# Patient Record
Sex: MALE | URBAN - METROPOLITAN AREA
[De-identification: names, ages, dates, MRNs, and addresses within clinical notes are randomized per-mention and may not be internally consistent; named-entity substitution may affect disease eponyms.]

---

## 2022-01-03 NOTE — PROGRESS NOTES
[facility]  [address], [address]  Phone: [phone]  Fax: [phone]        INITIAL CONSULTATION      HISTORY OF PRESENT ILLNESS:  Lyric Alejandro is a 49 y.o. male whom is self referred secondary to left shoulder pain and LUE pain in a C8 distribution to 4th and 5th digit x 4 months after coughing. Pt denies neck pain. He rates his pain 0-10/10. Pt states his pain is alleviated with cervical flexion and reclining back into a recliner. Pt states his pain had been holding steady and has progressed since Christmas without specific injury. 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 Pt states he has treated with Flexeril and Prednisone through [facility]. Pt reports temporary relief with the Prednisone, no relief with Flexeril. [facility] records unavailable. Pt is uninsured which may limit treatment options. Patient denies previous cervical surgery, injections, or physical therapy/chiropractic care. Pt denies fever, weight loss, or skin changes. Pt denies dropping things or loss of balance. Pt denies dropping things or loss of balance. Patient denies history of glaucoma. Denies DM. Pt is a smoker. No PmHx. The patient is LHD.  reviewed. Body mass index is 36.03 kg/m². PCP: Unknown, Provider, DPM    History reviewed. No pertinent past medical history. JAMES  Past Surgical History:   Procedure Laterality Date    HAND/FINGER SURGERY UNLISTED     D/FINGER SURGERY UNLISTED          Tobacco Use    Smoking status: Current Every Day Smoker     Years: 1.00    Smokeless tobacco: Never Used   Substance Use Topics    Alcohol use: Never       Work status: The patient is employed. Marital status: N/A .          Allergies   Allergen Reactions    Penicillins Other (comments)     Anaphylaxis              Family History   Problem Relation Age of Onset    No Known Problems Mother     No Known Problems Father REVIEW OF SYSTEMS  Constitutional symptoms: Negative  Eyes: Negative  Ears, Nose, Throat, and Mouth: Negative  Cardiovascular: Negative  Respiratory: Negative  Genitourinary: Negative  Integumentary (Skin and/or breast): Negative  Musculoskeletal: Positive for LUE  Extremities: Negative for edema. Endocrine/Rheumatologic: Negative  Hematologic/Lymphatic: Negative  Allergic/Immunologic: Negative  Psychiatric: Negative       PHYSICAL EXAMINATION  Visit Vitals  Pulse 69   Temp 97 °F (36.1 °C)   Resp 19   Ht 5' 9\" (1.753 m)   Wt 244 lb (110.7 kg)   SpO2 98%   BMI 36.03 kg/m²       CONSTITUTIONAL: NAD, A&O x 3  HEART: Regular rate and rhythm  GASTROINTESTINAL: Positive bowel sounds, soft, nontender, and nondistended  LUNGS: Clear to auscultation bilaterally. SKIN: Negative for rash. RANGE OF MOTION: The patient has full passive range of motion in all four extremities. SENSATION: Decreased sensation to light touch on the ulnar aspect between the elbow and the wrist of the LUE. Otherwise, sensation is intact to light touch throughout. MOTOR:   Straight Leg Raise: Negative, bilateral  Noble: Negative, bilateral  Tandem Gait: Neg. Deep tendon reflexes are 2 at the biceps, 2 at the brachioradialis and 2 at the triceps, bilaterally. Deep tendon reflexes are 2 at the knees and 1 at the ankles bilaterally. Shoulder AB/Flex Elbow Flex Wrist Ext Elbow Ext Wrist Flex Hand Intrin Tone   Right +4/5 +4/5 +4/5 +4/5 +4/5 +4/5 +4/5   Left +4/5 +4/5 +4/5 +4/5 +4/5 +4/5 +4/5              Hip Flex Knee Ext Knee Flex Ankle DF GTE Ankle PF Tone   Right +4/5 +4/5 +4/5 +4/5 +4/5 +4/5 +4/5   Left +4/5 +4/5 +4/5 +4/5 +4/5 +4/5 +4/5     RADIOGRAPHS  Cervical spine plain films dated 1/4/2022. 2 views: AP and lateral. Revealed: Slight retrolisthesis of C3 on C4. Mild to moderate disc spacing of C3-4 and C5-6. ASSESSMENT   Diagnoses and all orders for this visit:    1.  Neck pain  -     AMB POC XRAY, SPINE, CERVICAL; 2 OR 3    2. Cervical pain    3. Cervical neuritis    4. Cervical spondylosis without myelopathy    5. Spondylolisthesis, acquired       IMPRESSIONS/RECOMMENDATIONS:  Patient presents today with c/o left shoulder pain and LUE pain in a C8 distribution to 4th and 5th digit x 4 months after coughing. Multiple treatment options were discussed. I offered starting him on neuropathic medication, pt wished to proceed. I will try him on Neurontin 300 mg TID. The risks, benefits, and potential side effects of this medication were discussed. Patient understands and wishes to proceed. Patient advised to call the office if intolerant to new medication. Physical therapy may be cost prohibitive as he is self insured, I advised him to contact my office if he wishes to proceed at a later time. Patient is neurologically intact. I will see the patient back in 1 month's time or earlier if needed. Written by Santana Curtis, as dictated by Bucky Berumen MD  I examined the patient, reviewed and agree with the note.

## 2022-01-04 ENCOUNTER — OFFICE VISIT (OUTPATIENT)
Dept: ORTHOPEDIC SURGERY | Age: 52
End: 2022-01-04

## 2022-01-04 VITALS
OXYGEN SATURATION: 98 % | HEIGHT: 69 IN | BODY MASS INDEX: 36.14 KG/M2 | WEIGHT: 244 LBS | TEMPERATURE: 97 F | RESPIRATION RATE: 19 BRPM | HEART RATE: 69 BPM

## 2022-01-04 DIAGNOSIS — M54.2 CERVICAL PAIN: ICD-10-CM

## 2022-01-04 DIAGNOSIS — M54.12 CERVICAL NEURITIS: ICD-10-CM

## 2022-01-04 DIAGNOSIS — M47.812 CERVICAL SPONDYLOSIS WITHOUT MYELOPATHY: ICD-10-CM

## 2022-01-04 DIAGNOSIS — M54.2 NECK PAIN: Primary | ICD-10-CM

## 2022-01-04 DIAGNOSIS — M43.10 SPONDYLOLISTHESIS, ACQUIRED: ICD-10-CM

## 2022-01-04 PROCEDURE — 99204 OFFICE O/P NEW MOD 45 MIN: CPT | Performed by: PHYSICAL MEDICINE & REHABILITATION

## 2022-01-04 PROCEDURE — 72040 X-RAY EXAM NECK SPINE 2-3 VW: CPT | Performed by: PHYSICAL MEDICINE & REHABILITATION

## 2022-01-04 RX ORDER — GABAPENTIN 300 MG/1
300 CAPSULE ORAL 3 TIMES DAILY
Qty: 90 CAPSULE | Refills: 1 | Status: SHIPPED | OUTPATIENT
Start: 2022-01-04 | End: 2022-03-04

## 2022-02-02 ENCOUNTER — TELEPHONE (OUTPATIENT)
Dept: ORTHOPEDIC SURGERY | Age: 52
End: 2022-02-02

## 2022-02-02 NOTE — TELEPHONE ENCOUNTER
Patient was last see and prescribed Gabapentin on 01/04/22. He has a follow up scheduled for Friday morning 02/04/22 but he will run out of medication after his morning dose tomorrow morning. He was advised not to miss any doses. He is asking if we can send a refill to his pharmacy.       [facility] Raoul Huertas  Patient [phone]

## 2022-02-02 NOTE — PROGRESS NOTES
[facility]  [address], [address]   Phone: [phone]  Fax: [phone]        PROGRESS NOTE      HISTORY OF PRESENT ILLNESS:  The patient is a 49 y.o. male and was seen today for follow up of left shoulder pain and LUE pain in a C8 distribution to 4th and 5th digit x 4 months after coughing. Pt denies neck pain. Pt states his pain is alleviated with cervical flexion and reclining back into a recliner. Pt states his pain had been holding steady and has progressed since Christmas without specific injury. 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 Pt states he has treated with Flexeril and Prednisone through [facility]. Pt reports temporary relief with the Prednisone, no relief with Flexeril. [facility] records unavailable. Patient denies previous cervical surgery, injections, or physical therapy/chiropractic care. Pt denies fever, weight loss, or skin changes. Pt denies dropping things or loss of balance. Pt denies dropping things or loss of balance. Patient denies history of glaucoma. Denies DM. Pt is a smoker. The patient is LHD. No PmHx. Cervical spine plain films dated 1/4/2022. 2 views: AP and lateral. Revealed: Slight retrolisthesis of C3 on C4. Mild to moderate disc spacing of C3-4 and C5-6. At his last clinical appointment, I offered starting him on neuropathic medication, pt wished to proceed. I tried him on Neurontin 300 mg TID. The risks, benefits, and potential side effects of this medication were discussed. Patient understands and wishes to proceed. Patient advised to call the office if intolerant to new medication. Physical therapy may be cost prohibitive as he was self insured, I advised him to contact my office if he wished to proceed at a later time. The patient returns today and reports pain location and distribution remains unchanged. He rates his pain 6/10, previously 0-10/10.  Pt is tolerating the Neurontin 300 mg TID with benefit. His pain is less intense and more intermittent in nature. Pt denies dropping things or loss of balance.  reviewed. Body mass index is 36.03 kg/m². PCP: Unknown, Provider, DPM      History reviewed. No pertinent past medical history. Social History     Socioeconomic History    Marital status: UNKNOWN     Spouse name: Not on file    Number of children: Not on file    Years of education: Not on file    Highest education level: Not on file   Occupational History    Not on file   Tobacco Use    Smoking status: Current Every Day Smoker     Years: 1.00    Smokeless tobacco: Never Used   Vaping Use    Vaping Use: Never used   Substance and Sexual Activity    Alcohol use: Never    Drug use: Yes     Types: Marijuana    Sexual activity: Not on file   Other Topics Concern    Not on file   Social History Narrative    Not on file     Social Determinants of Health     Financial Resource Strain:     Difficulty of Paying Living Expenses: Not on file   Food Insecurity:     Worried About Running Out of Food in the Last Year: Not on file    Samuel of Food in the Last Year: Not on file   Transportation Needs:     Lack of Transportation (Medical): Not on file    Lack of Transportation (Non-Medical):  Not on file   Physical Activity:     Days of Exercise per Week: Not on file    Minutes of Exercise per Session: Not on file   Stress:     Feeling of Stress : Not on file   Social Connections:     Frequency of Communication with Friends and Family: Not on file    Frequency of Social Gatherings with Friends and Family: Not on file    Attends Worship Services: Not on file    Active Member of Clubs or Organizations: Not on file    Attends Club or Organization Meetings: Not on file    Marital Status: Not on file   Intimate Partner Violence:     Fear of Current or Ex-Partner: Not on file    Emotionally Abused: Not on file    Physically Abused: Not on file    Sexually Abused: Not on file   Housing Stability:     Unable to Pay for Housing in the Last Year: Not on file    Number of Places Lived in the Last Year: Not on file    Unstable Housing in the Last Year: Not on file       Current Outpatient Medications   Medication Sig Dispense Refill    gabapentin (Neurontin) 300 mg capsule Take 1 Capsule by mouth three (3) times daily. Max Daily Amount: 900 mg. 90 Capsule 1       Allergies   Allergen Reactions    Penicillins Other (comments)     Anaphylaxis            PHYSICAL EXAMINATION    Visit Vitals  Pulse 77   Temp 97.6 °F (36.4 °C) (Temporal)   Ht 5' 9\" (1.753 m)   Wt 244 lb (110.7 kg)   SpO2 98%   BMI 36.03 kg/m²       CONSTITUTIONAL: NAD, A&O x 3  SENSATION: Intact to light touch throughout  NEURO: Kathryn's is negative bilaterally. RANGE OF MOTION: The patient has full passive range of motion in all four extremities. MOTOR:     Shoulder AB/Flex Elbow Flex Wrist Ext Elbow Ext Wrist Flex Hand Intrin Tone   Right +4/5 +4/5 +4/5 +4/5 +4/5 +4/5 +4/5   Left +4/5 +4/5 +4/5 +4/5 +4/5 +4/5 +4/5               ASSESSMENT   Diagnoses and all orders for this visit:    1. Neck pain    2. Cervical pain    3. Cervical neuritis    4. Cervical spondylosis without myelopathy    5. Spondylolisthesis, acquired      IMPRESSION AND PLAN:  Patient returns to the office today with c/o left shoulder pain and LUE pain in a C8 distribution to 4th and 5th digit. Multiple treatment options were discussed. I will increase his Neurontin 300 mg TID to 600 mg TID. Patient advised to call office if intolerant to higher dose. PT continues to be cost prohibitive at this time. Patient is neurologically intact. I will see the patient back in 1 month's time or earlier if needed. Written by Mason Sue, as dictated by Betty Quigley MD  I examined the patient, reviewed and agree with the note.

## 2022-02-04 ENCOUNTER — OFFICE VISIT (OUTPATIENT)
Dept: ORTHOPEDIC SURGERY | Age: 52
End: 2022-02-04

## 2022-02-04 VITALS
WEIGHT: 244 LBS | HEIGHT: 69 IN | OXYGEN SATURATION: 98 % | TEMPERATURE: 97.6 F | BODY MASS INDEX: 36.14 KG/M2 | HEART RATE: 77 BPM

## 2022-02-04 DIAGNOSIS — M43.10 SPONDYLOLISTHESIS, ACQUIRED: ICD-10-CM

## 2022-02-04 DIAGNOSIS — M54.2 CERVICAL PAIN: ICD-10-CM

## 2022-02-04 DIAGNOSIS — M54.2 NECK PAIN: Primary | ICD-10-CM

## 2022-02-04 DIAGNOSIS — M54.12 CERVICAL NEURITIS: ICD-10-CM

## 2022-02-04 DIAGNOSIS — M47.812 CERVICAL SPONDYLOSIS WITHOUT MYELOPATHY: ICD-10-CM

## 2022-02-04 PROCEDURE — 99214 OFFICE O/P EST MOD 30 MIN: CPT | Performed by: PHYSICAL MEDICINE & REHABILITATION

## 2022-02-04 RX ORDER — GABAPENTIN 600 MG/1
600 TABLET ORAL 3 TIMES DAILY
Qty: 90 TABLET | Refills: 1 | Status: SHIPPED | OUTPATIENT
Start: 2022-02-04 | End: 2022-03-04

## 2022-03-02 NOTE — PROGRESS NOTES
[facility]  [address], [address]   Phone: [phone]  Fax: [phone]        PROGRESS NOTE      HISTORY OF PRESENT ILLNESS:  The patient is a 49 y.o. male and was seen today for follow up of left shoulder pain and LUE pain in a C8 distribution to 4th and 5th digit x 4 months after coughing. Pt denies neck pain. Pt states his pain is alleviated with cervical flexion and reclining back into a recliner. Pt states his pain had been holding steady and has progressed since Christmas without specific injury. 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 Pt states he has treated with Flexeril and Prednisone through [facility]. Pt reports temporary relief with the Prednisone, no relief with Flexeril. [facility] records unavailable. Patient denies previous cervical surgery, injections, or physical therapy/chiropractic care. Pt denies fever, weight loss, or skin changes. Pt denies dropping things or loss of balance. Pt denies dropping things or loss of balance. Patient denies history of glaucoma. Denies DM. Pt is a smoker. The patient is LHD. PmHx noncontributory. Cervical spine plain films dated 1/4/2022. 2 views: AP and lateral. Revealed: Slight retrolisthesis of C3 on C4. Mild to moderate disc spacing of C3-4 and C5-6. At his last clinical appointment, I increased his Neurontin 300 mg TID to 600 mg TID. Patient was advised to call office if intolerant to higher dose. PT continued to be cost prohibitive.        The patient returns today and reports pain location and distribution remains unchanged. He rates his pain 3/10, previously 6/10. He is tolerating the Neurontin 600 mg TID w/ benefit. Pain is less intense in nature. Denies being on a higher dose. Pt denies dropping things or loss of balance.  reviewed. Body mass index is 36.45 kg/m². PCP: Unknown, Provider, DPM      History reviewed.  No pertinent past medical history. Social History     Socioeconomic History    Marital status: UNKNOWN     Spouse name: Not on file    Number of children: Not on file    Years of education: Not on file    Highest education level: Not on file   Occupational History    Not on file   Tobacco Use    Smoking status: Current Every Day Smoker     Years: 1.00    Smokeless tobacco: Never Used   Vaping Use    Vaping Use: Never used   Substance and Sexual Activity    Alcohol use: Never    Drug use: Yes     Types: Marijuana    Sexual activity: Not on file   Other Topics Concern    Not on file   Social History Narrative    Not on file     Social Determinants of Health     Financial Resource Strain:     Difficulty of Paying Living Expenses: Not on file   Food Insecurity:     Worried About Running Out of Food in the Last Year: Not on file    Samuel of Food in the Last Year: Not on file   Transportation Needs:     Lack of Transportation (Medical): Not on file    Lack of Transportation (Non-Medical):  Not on file   Physical Activity:     Days of Exercise per Week: Not on file    Minutes of Exercise per Session: Not on file   Stress:     Feeling of Stress : Not on file   Social Connections:     Frequency of Communication with Friends and Family: Not on file    Frequency of Social Gatherings with Friends and Family: Not on file    Attends Jew Services: Not on file    Active Member of [address] [place] [address] [employer] or Organizations: Not on file    Attends Club or Organization Meetings: Not on file    Marital Status: Not on file   Intimate Partner Violence:     Fear of Current or Ex-Partner: Not on file    Emotionally Abused: Not on file    Physically Abused: Not on file    Sexually Abused: Not on file   Housing Stability:     Unable to Pay for Housing in the Last Year: Not on file    Number of [place] in the Last Year: Not on file    Unstable Housing in the Last Year: Not on file           Allergies   Allergen Reactions    Penicillins Other (comments)     Anaphylaxis            PHYSICAL EXAMINATION    Visit Vitals  Pulse 72   Temp 97.9 °F (36.6 °C) (Temporal)   Resp 18   Ht 5' 9\" (1.753 m)   Wt 246 lb 12.8 oz (111.9 kg)   SpO2 98%   BMI 36.45 kg/m²       CONSTITUTIONAL: NAD, A&O x 3  SENSATION: Intact to light touch throughout  NEURO: Kathryn's is negative bilaterally. RANGE OF MOTION: The patient has full passive range of motion in all four extremities. MOTOR:     Shoulder AB/Flex Elbow Flex Wrist Ext Elbow Ext Wrist Flex Hand Intrin Tone   Right +4/5 +4/5 +4/5 +4/5 +4/5 +4/5 +4/5   Left +4/5 +4/5 +4/5 +4/5 +4/5 +4/5 +4/5                 ASSESSMENT   Diagnoses and all orders for this visit:    1. Neck pain    2. Cervical pain    3. Cervical neuritis    4. Cervical spondylosis without myelopathy    5. Spondylolisthesis, acquired      IMPRESSION AND PLAN:  Patient returns to the office today with c/o left shoulder pain and LUE pain in a C8 distribution to 4th and 5th digit. Multiple treatment options were discussed. I will increase his Neurontin 600 mg TID to 800 mg TID. Patient was advised to call office if intolerant to higher dose. Patient is neurologically intact. I will see the patient back in 1 month's time or earlier if needed. Written by Manuel Coates, as dictated by Ximena Lin MD  I examined the patient, reviewed and agree with the note.

## 2022-03-04 ENCOUNTER — OFFICE VISIT (OUTPATIENT)
Dept: ORTHOPEDIC SURGERY | Age: 52
End: 2022-03-04

## 2022-03-04 VITALS
HEART RATE: 72 BPM | OXYGEN SATURATION: 98 % | TEMPERATURE: 97.9 F | BODY MASS INDEX: 36.56 KG/M2 | WEIGHT: 246.8 LBS | HEIGHT: 69 IN | RESPIRATION RATE: 18 BRPM

## 2022-03-04 DIAGNOSIS — M54.2 NECK PAIN: Primary | ICD-10-CM

## 2022-03-04 DIAGNOSIS — M54.12 CERVICAL NEURITIS: ICD-10-CM

## 2022-03-04 DIAGNOSIS — M54.2 CERVICAL PAIN: ICD-10-CM

## 2022-03-04 DIAGNOSIS — M47.812 CERVICAL SPONDYLOSIS WITHOUT MYELOPATHY: ICD-10-CM

## 2022-03-04 DIAGNOSIS — M43.10 SPONDYLOLISTHESIS, ACQUIRED: ICD-10-CM

## 2022-03-04 PROCEDURE — 99214 OFFICE O/P EST MOD 30 MIN: CPT | Performed by: PHYSICAL MEDICINE & REHABILITATION

## 2022-03-04 RX ORDER — GABAPENTIN 800 MG/1
800 TABLET ORAL 3 TIMES DAILY
Qty: 90 TABLET | Refills: 1 | Status: SHIPPED | OUTPATIENT
Start: 2022-03-04 | End: 2022-04-26

## 2022-04-01 ENCOUNTER — TELEPHONE (OUTPATIENT)
Dept: ORTHOPEDIC SURGERY | Age: 52
End: 2022-04-01

## 2022-04-01 NOTE — TELEPHONE ENCOUNTER
Pt had an appointment with Dayanna Jansen on 4/8. Dayanna Jansen will not be in office that day. Pt rs to 4/26 and states he will be out of medication by then. Please call [phone].

## 2022-04-25 NOTE — PROGRESS NOTES
[facility]  [address], [place] [address]   Phone: [phone]  Fax: [phone]        PROGRESS NOTE      HISTORY OF PRESENT ILLNESS:  The patient is a 49 y.o. male and was seen today for follow up of left shoulder pain and LUE pain in a C8 distribution to 4th and 5th digit x 4 months after coughing. Pt denies neck pain. Pt states his pain is alleviated with cervical flexion and reclining back into a recliner. Pt states his pain had been holding steady and has progressed since Christmas without specific injury. 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 Pt states he has treated with Flexeril and Prednisone through [facility]. Pt reports temporary relief with the Prednisone, no relief with Flexeril. [facility] records unavailable. Patient denies previous cervical surgery, injections, or physical therapy/chiropractic care. Pt denies fever, weight loss, or skin changes. Pt denies dropping things or loss of balance. Pt denies dropping things or loss of balance. Patient denies history of glaucoma. Denies DM. Pt is a smoker. The patient is LHD. PmHx noncontributory. Cervical spine plain films dated 1/4/2022. 2 views: AP and lateral. Revealed: Slight retrolisthesis of C3 on C4. Mild to moderate disc spacing of C3-4 and C5-6. At his last clinical appointment, I increased his Neurontin 600 mg TID to 800 mg TID. Patient was advised to call office if intolerant to higher dose.       The patient returns today and reports pain location and distribution remains unchanged. He rates his pain 1/10, previously 3/10. He is tolerating the Neurontin 800 mg TID w/ benefit. He admits his pain is less intense in nature. Pt denies dropping things or loss of balance.  reviewed. Body mass index is 36.92 kg/m². PCP: Unknown, Provider, MD      History reviewed. No pertinent past medical history.      Social History     Socioeconomic History    Marital status: UNKNOWN     Spouse name: Not on file    Number of children: Not on file    Years of education: Not on file    Highest education level: Not on file   Occupational History    Not on file   Tobacco Use    Smoking status: Current Every Day Smoker     Years: 1.00    Smokeless tobacco: Never Used   Vaping Use    Vaping Use: Never used   Substance and Sexual Activity    Alcohol use: Never    Drug use: Yes     Types: Marijuana    Sexual activity: Not on file   Other Topics Concern    Not on file   Social History Narrative    Not on file     Social Determinants of Health     Financial Resource Strain:     Difficulty of Paying Living Expenses: Not on file   Food Insecurity:     Worried About [address] in the Last Year: Not on file    Samuel of Food in the Last Year: Not on file   Transportation Needs:     Lack of Transportation (Medical): Not on file    Lack of Transportation (Non-Medical):  Not on file   Physical Activity:     Days of Exercise per Week: Not on file    Minutes of Exercise per Session: Not on file   Stress:     Feeling of Stress : Not on file   Social Connections:     Frequency of Communication with Friends and Family: Not on file    Frequency of Social Gatherings with Friends and Family: Not on file    Attends Advent Services: Not on file    Active Member of [address] [place] [address] [employer] or Organizations: Not on file    Attends Club or Organization Meetings: Not on file    Marital Status: Not on file   Intimate Partner Violence:     Fear of Current or Ex-Partner: Not on file    Emotionally Abused: Not on file    Physically Abused: Not on file    Sexually Abused: Not on file   Housing Stability:     Unable to Pay for Housing in the Last Year: Not on file    Number of [place] in the Last Year: Not on file    Unstable Housing in the Last Year: Not on file       Current Outpatient Medications   Medication Sig Dispense Refill    gabapentin (Neurontin) 800 mg tablet Take 1 Tablet by mouth three (3) times daily. Max Daily Amount: 2,400 mg. 90 Tablet 1       Allergies   Allergen Reactions    Penicillins Other (comments)     Anaphylaxis            PHYSICAL EXAMINATION    Visit Vitals  Pulse 67   Temp 97.5 °F (36.4 °C)   Resp 17   Wt 250 lb (113.4 kg)   SpO2 94%   BMI 36.92 kg/m²       CONSTITUTIONAL: NAD, A&O x 3  SENSATION: Intact to light touch throughout  NEURO: Kathryn's is negative bilaterally. RANGE OF MOTION: The patient has full passive range of motion in all four extremities. MOTOR:     Shoulder AB/Flex Elbow Flex Wrist Ext Elbow Ext Wrist Flex Hand Intrin Tone   Right +4/5 +4/5 +4/5 +4/5 +4/5 +4/5 +4/5   Left +4/5 +4/5 +4/5 +4/5 +4/5 +4/5 +4/5                 ASSESSMENT   Diagnoses and all orders for this visit:    1. Neck pain    2. Cervical pain    3. Cervical neuritis    4. Cervical spondylosis without myelopathy    5. Spondylolisthesis, acquired      IMPRESSION AND PLAN:  Patient returns to the office today with c/o left shoulder pain and LUE pain in a C8 distribution to 4th and 5th digit. Multiple treatment options were discussed. I will try him on Cymbalta 30 mg daily. The risks, benefits, and potential side effects of this medication were discussed. Patient understands and wishes to proceed. Patient advised to call the office if intolerant to new medication. I provided him refills for Neurontin 800 mg TID. Patient is neurologically intact. I will see the patient back in 1 month's time or earlier if needed. Written by [employer] Groupl[employer]na Blackwell, as dictated by Nury Bonilla MD  I examined the patient, reviewed and agree with the note.

## 2022-04-26 ENCOUNTER — OFFICE VISIT (OUTPATIENT)
Dept: ORTHOPEDIC SURGERY | Age: 52
End: 2022-04-26

## 2022-04-26 VITALS
BODY MASS INDEX: 36.92 KG/M2 | WEIGHT: 250 LBS | TEMPERATURE: 97.5 F | RESPIRATION RATE: 17 BRPM | HEART RATE: 67 BPM | OXYGEN SATURATION: 94 %

## 2022-04-26 DIAGNOSIS — M54.2 CERVICAL PAIN: ICD-10-CM

## 2022-04-26 DIAGNOSIS — M54.2 NECK PAIN: Primary | ICD-10-CM

## 2022-04-26 DIAGNOSIS — M47.812 CERVICAL SPONDYLOSIS WITHOUT MYELOPATHY: ICD-10-CM

## 2022-04-26 DIAGNOSIS — M43.10 SPONDYLOLISTHESIS, ACQUIRED: ICD-10-CM

## 2022-04-26 DIAGNOSIS — M54.12 CERVICAL NEURITIS: ICD-10-CM

## 2022-04-26 PROCEDURE — 99214 OFFICE O/P EST MOD 30 MIN: CPT | Performed by: PHYSICAL MEDICINE & REHABILITATION

## 2022-04-26 RX ORDER — GABAPENTIN 800 MG/1
800 TABLET ORAL 3 TIMES DAILY
Qty: 270 TABLET | Refills: 0 | Status: SHIPPED | OUTPATIENT
Start: 2022-04-26 | End: 2022-05-27 | Stop reason: SDUPTHER

## 2022-04-26 RX ORDER — DULOXETIN HYDROCHLORIDE 30 MG/1
30 CAPSULE, DELAYED RELEASE ORAL DAILY
Qty: 30 CAPSULE | Refills: 1 | Status: SHIPPED | OUTPATIENT
Start: 2022-04-26 | End: 2022-05-27 | Stop reason: SDUPTHER

## 2022-05-27 ENCOUNTER — OFFICE VISIT (OUTPATIENT)
Dept: ORTHOPEDIC SURGERY | Age: 52
End: 2022-05-27

## 2022-05-27 VITALS
WEIGHT: 246 LBS | OXYGEN SATURATION: 94 % | HEART RATE: 60 BPM | TEMPERATURE: 98 F | RESPIRATION RATE: 18 BRPM | BODY MASS INDEX: 36.43 KG/M2 | HEIGHT: 69 IN

## 2022-05-27 DIAGNOSIS — M54.12 CERVICAL NEURITIS: Primary | ICD-10-CM

## 2022-05-27 DIAGNOSIS — M54.2 NECK PAIN: ICD-10-CM

## 2022-05-27 DIAGNOSIS — M43.10 SPONDYLOLISTHESIS, ACQUIRED: ICD-10-CM

## 2022-05-27 DIAGNOSIS — M47.812 CERVICAL SPONDYLOSIS WITHOUT MYELOPATHY: ICD-10-CM

## 2022-05-27 DIAGNOSIS — M54.2 CERVICAL PAIN: ICD-10-CM

## 2022-05-27 PROCEDURE — 99213 OFFICE O/P EST LOW 20 MIN: CPT | Performed by: PHYSICAL MEDICINE & REHABILITATION

## 2022-05-27 RX ORDER — GABAPENTIN 800 MG/1
800 TABLET ORAL 3 TIMES DAILY
Qty: 270 TABLET | Refills: 0 | Status: SHIPPED | OUTPATIENT
Start: 2022-05-27 | End: 2022-09-13 | Stop reason: SDUPTHER

## 2022-05-27 RX ORDER — DULOXETIN HYDROCHLORIDE 30 MG/1
30 CAPSULE, DELAYED RELEASE ORAL DAILY
Qty: 90 CAPSULE | Refills: 0 | Status: SHIPPED | OUTPATIENT
Start: 2022-05-27

## 2022-05-27 NOTE — PROGRESS NOTES
[facility]  [address], [place]   Phone: [phone]  Fax: [phone]        PROGRESS NOTE      HISTORY OF PRESENT ILLNESS:  The patient is a 49 y.o. male and was seen today for follow up of  left shoulder pain and LUE pain in a C8 distribution to 4th and 5th digit x 7 months after coughing. Pt denies neck pain. Pt states his pain is alleviated with cervical flexion and reclining back into a recliner. Pt states his pain had been holding steady and has progressed since Christmas without specific injury. 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 Pt states he has treated with Flexeril and Prednisone through [facility]. Pt reports temporary relief with the Prednisone, no relief with Flexeril. [facility] records unavailable. Patient denies previous cervical surgery, injections, or physical therapy/chiropractic care. Pt denies fever, weight loss, or skin changes. Pt denies dropping things or loss of balance. Pt denies dropping things or loss of balance. Patient denies history of glaucoma. Denies DM. Pt is a smoker. The patient is LHD. PmHx noncontributory. Cervical spine plain films dated 1/4/2022. 2 views: AP and lateral. Revealed: Slight retrolisthesis of C3 on C4. Mild to moderate disc spacing of C3-4 and C5-6.  I increased his Neurontin 600 mg TID to 800 mg TID. Patient was advised to call office if intolerant to higher dose. At his last clinical appointment, I tried him on Cymbalta 30 mg daily. The risks, benefits, and potential side effects of this medication were discussed. Patient understood and wished to proceed. Patient advised to call the office if intolerant to new medication. I provided him refills for Neurontin 800 mg TID. The patient returns today with left shoulder and LUE pain radiating into the C8 distribution. He rates his pain 0/10, previously 1/10. Pt reports his pain is completely gone. He continues to have numbness and tingling in his left forearm and left 4th and 5th digit. Denies dropping things or loss of balance. Pt is able to tolerate the combination Cymbalta 30 mg and Gabapentin 800 mg TID with benefit. He states he ran out of Gabapentin 800 mg, so he started taking Gabapentin 600 mg QID.  reviewed. Body mass index is 36.33 kg/m². PCP: Unknown, Provider, MD      History reviewed. No pertinent past medical history. Social History     Socioeconomic History    Marital status: UNKNOWN     Spouse name: Not on file    Number of children: Not on file    Years of education: Not on file    Highest education level: Not on file   Occupational History    Not on file   Tobacco Use    Smoking status: Current Every Day Smoker     Years: 1.00    Smokeless tobacco: Never Used   Vaping Use    Vaping Use: Never used   Substance and Sexual Activity    Alcohol use: Never    Drug use: Yes     Types: Marijuana    Sexual activity: Not on file   Other Topics Concern    Not on file   Social History Narrative    Not on file     Social Determinants of Health     Financial Resource Strain:     Difficulty of Paying Living Expenses: Not on file   Food Insecurity:     Worried About Running Out of Food in the Last Year: Not on file    Samuel of Food in the Last Year: Not on file   Transportation Needs:     Lack of Transportation (Medical): Not on file    Lack of Transportation (Non-Medical):  Not on file   Physical Activity:     Days of Exercise per Week: Not on file    Minutes of Exercise per Session: Not on file   Stress:     Feeling of Stress : Not on file   Social Connections:     Frequency of Communication with Friends and Family: Not on file    Frequency of Social Gatherings with Friends and Family: Not on file    Attends Mandaeism Services: Not on file    Active Member of Clubs or Organizations: Not on file    Attends Club or Organization Meetings: Not on file    Marital Status: Not on file   Intimate Partner Violence:     Fear of Current or Ex-Partner: Not on file    Emotionally Abused: Not on file    Physically Abused: Not on file    Sexually Abused: Not on file   Housing Stability:     Unable to Pay for Housing in the Last Year: Not on file    Number of [place] in the Last Year: Not on file    Unstable Housing in the Last Year: Not on file       Current Outpatient Medications   Medication Sig Dispense Refill    gabapentin (Neurontin) 800 mg tablet Take 1 Tablet by mouth three (3) times daily. Max Daily Amount: 2,400 mg. 270 Tablet 0    DULoxetine (CYMBALTA) 30 mg capsule Take 1 Capsule by mouth daily. 30 Capsule 1       Allergies   Allergen Reactions    Penicillins Other (comments)     Anaphylaxis            PHYSICAL EXAMINATION    Visit Vitals  Pulse 60   Temp 98 °F (36.7 °C) (Temporal)   Resp 18   Ht 5' 9\" (1.753 m)   Wt 246 lb (111.6 kg)   SpO2 94%   BMI 36.33 kg/m²       CONSTITUTIONAL: NAD, A&O x 3  SENSATION: Intact to light touch throughout  NEURO: Kathryn's is negative bilaterally. RANGE OF MOTION: The patient has full passive range of motion in all four extremities. MOTOR:  Straight Leg Raise: Not Tested, bilateral       Shoulder AB/Flex Elbow Flex Wrist Ext Elbow Ext Wrist Flex Hand Intrin Tone   Right +4/5 +4/5 +4/5 +4/5 +4/5 +4/5 +4/5   Left +4/5 +4/5 +4/5 +4/5 +4/5 +4/5 +4/5              Hip Flex Knee Ext Knee Flex Ankle DF GTE Ankle PF Tone   Right +4/5 +4/5 +4/5 +4/5 +4/5 +4/5 +4/5   Left +4/5 +4/5 +4/5 +4/5 +4/5 +4/5 +4/5       ASSESSMENT   Diagnoses and all orders for this visit:    1. Cervical neuritis    2. Cervical spondylosis without myelopathy    3. Spondylolisthesis, acquired    4. Cervical pain          IMPRESSION AND PLAN:  Patient returns to the office today with c/o left shoulder and LUE pain radiating into the C8 distribution. Patient is currently has no pain but continues to has numbness and tingling in is 4th and 5th digit.  Multiple treatment options were discussed. I discussed weaning the patient's Gabapentin, he would like to continue current course of treatment. I refilled his Cymbalta. Patient is neurologically intact. I will see the patient back in 3 month's time or earlier if needed. Written by Krissy Carlisle, as dictated by Fredrick Mahan MD  I examined the patient, reviewed and agree with the note.

## 2022-09-12 DIAGNOSIS — M54.12 CERVICAL NEURITIS: ICD-10-CM

## 2022-09-12 DIAGNOSIS — M54.2 NECK PAIN: ICD-10-CM

## 2022-09-12 RX ORDER — GABAPENTIN 800 MG/1
TABLET ORAL
Qty: 270 TABLET | Refills: 0 | OUTPATIENT
Start: 2022-09-12

## 2022-09-13 ENCOUNTER — TELEPHONE (OUTPATIENT)
Dept: ORTHOPEDIC SURGERY | Age: 52
End: 2022-09-13

## 2022-09-13 RX ORDER — GABAPENTIN 800 MG/1
800 TABLET ORAL 3 TIMES DAILY
Qty: 270 TABLET | Refills: 0 | Status: SHIPPED | OUTPATIENT
Start: 2022-09-13

## 2022-09-13 NOTE — TELEPHONE ENCOUNTER
, Ciro Grace MD  You 14 hours ago (6:58 PM)     NP  Looks like he missed his appointment last month. The message says he is out of his meds. Looks like he should have run out about a month ago. Can't start back at 800mg TID of neurontin. Will need to ramp back up. Ok to rx neurontin 300mg po TID no refills, ramp back up.   Should last until appointment 10/10/2022

## 2022-09-13 NOTE — TELEPHONE ENCOUNTER
Patient called to check the status of his refill request for Gabapentin. Patient was told that his request was refused, and he is asking to speak with a nurse to find out why. Please advise. Patient states he can be reached at [phone].

## 2022-09-13 NOTE — TELEPHONE ENCOUNTER
Patient states he ran out yesterday. All of the past prescriptions had refills and/or 270 tabs at the time so he had enough to get him to now.

## 2022-09-13 NOTE — TELEPHONE ENCOUNTER
Patient contacted and told that his message has not been answered by Dr Estrella Singleton yet and that I would call as soon as he does.

## 2022-11-21 ENCOUNTER — OFFICE VISIT (OUTPATIENT)
Dept: ORTHOPEDIC SURGERY | Age: 52
End: 2022-11-21

## 2022-11-21 VITALS
TEMPERATURE: 97.9 F | OXYGEN SATURATION: 95 % | WEIGHT: 257 LBS | HEIGHT: 69 IN | RESPIRATION RATE: 18 BRPM | HEART RATE: 74 BPM | BODY MASS INDEX: 38.06 KG/M2

## 2022-11-21 DIAGNOSIS — M54.12 CERVICAL NEURITIS: ICD-10-CM

## 2022-11-21 DIAGNOSIS — M47.812 CERVICAL SPONDYLOSIS WITHOUT MYELOPATHY: Primary | ICD-10-CM

## 2022-11-21 DIAGNOSIS — M54.2 NECK PAIN: ICD-10-CM

## 2022-11-21 DIAGNOSIS — M43.10 SPONDYLOLISTHESIS, ACQUIRED: ICD-10-CM

## 2022-11-21 PROCEDURE — 99213 OFFICE O/P EST LOW 20 MIN: CPT | Performed by: PHYSICAL MEDICINE & REHABILITATION

## 2022-11-21 RX ORDER — GABAPENTIN 800 MG/1
800 TABLET ORAL 3 TIMES DAILY
Qty: 270 TABLET | Refills: 1 | Status: SHIPPED | OUTPATIENT
Start: 2022-11-21

## 2022-11-21 NOTE — PROGRESS NOTES
[facility]  [address]  [facility], [facility] [address]  Phone: [phone]  Fax: [phone]        PROGRESS NOTE      HISTORY OF PRESENT ILLNESS:  The patient is a 47 y.o. male and was seen today for follow up of left shoulder pain and LUE pain in a C8 distribution to 4th and 5th digit x 7 months after coughing. Pt denies neck pain. Pt states his pain is alleviated with cervical flexion and reclining back into a recliner. Pt states his pain had been holding steady and has progressed since Christmas without specific injury. Pt states he has treated with Flexeril and Prednisone through [facility]. Pt reports temporary relief with the Prednisone, no relief with Flexeril. [facility] records unavailable. Patient denies previous cervical surgery, injections, or physical therapy/chiropractic care. Pt denies fever, weight loss, or skin changes. Pt denies dropping things or loss of balance. Pt denies dropping things or loss of balance. Patient denies history of glaucoma. Denies DM. Pt is a smoker. The patient is LHD. PmHx noncontributory. Cervical spine plain films dated 1/4/2022. 2 views: AP and lateral. Revealed: Slight retrolisthesis of C3 on C4. Mild to moderate disc spacing of C3-4 and C5-6. I increased his Neurontin 600 mg TID to 800 mg TID. Patient was advised to call office if intolerant to higher dose. At his last clinical appointment,  I discussed weaning the patient's Gabapentin, he would like to continue current course of treatment. I refilled his Cymbalta. The patient returns today with left shoulder pain that radiates to the LUE to the elbow, and paresthesia to digits 4 and 5. He rates his pain 0-3/10, previously 0/10. Patient reports he thinks he has new lateral left shoulder muscle pain. Pt denies dropping things or loss of balance. Patient says that overall his pain is the same when compared to last office visit.  Patient reports his pain is a 3/10 when he coughs. Patient is taking Gabapentin 800 mg TID. Patient reports he has been off of the Cymalta for the past 5 months.  reviewed. Body mass index is 37.95 kg/m². PCP: Unknown, Provider, MD      History reviewed. No pertinent past medical history. Social History     Socioeconomic History    Marital status: UNKNOWN     Spouse name: Not on file    Number of children: Not on file    Years of education: Not on file    Highest education level: Not on file   Occupational History    Not on file   Tobacco Use    Smoking status: Every Day     Years: 1.00     Types: Cigarettes    Smokeless tobacco: Never   Vaping Use    Vaping Use: Never used   Substance and Sexual Activity    Alcohol use: Never    Drug use: Yes     Types: Marijuana    Sexual activity: Not on file   Other Topics Concern    Not on file   Social History Narrative    Not on file     Social Determinants of Health     Financial Resource Strain: Not on file   Food Insecurity: Not on file   Transportation Needs: Not on file   Physical Activity: Not on file   Stress: Not on file   Social Connections: Not on file   Intimate Partner Violence: Not on file   Housing Stability: Not on file       Current Outpatient Medications   Medication Sig Dispense Refill    gabapentin (Neurontin) 800 mg tablet Take 1 Tablet by mouth three (3) times daily. Max Daily Amount: 2,400 mg. 270 Tablet 0    DULoxetine (CYMBALTA) 30 mg capsule Take 1 Capsule by mouth daily. (Patient not taking: Reported on 11/21/2022) 90 Capsule 0       Allergies   Allergen Reactions    Penicillins Other (comments)     Anaphylaxis            PHYSICAL EXAMINATION    Visit Vitals  Pulse 74   Temp 97.9 °F (36.6 °C) (Temporal)   Resp 18   Ht 5' 9\" (1.753 m)   Wt 257 lb (116.6 kg)   SpO2 95%   BMI 37.95 kg/m²       CONSTITUTIONAL: NAD, A&O x 3  SENSATION: Intact to light touch throughout  NEURO: Kathryn's is negative bilaterally.   RANGE OF MOTION: The patient has full passive range of motion in all four extremities. MOTOR:      Ambulates without assistive device. Shoulder AB/Flex Elbow Flex Wrist Ext Elbow Ext Wrist Flex Hand Intrin Tone   Right +4/5 +4/5 +4/5 +4/5 +4/5 +4/5 +4/5   Left +4/5 +4/5 +4/5 +4/5 +4/5 +4/5 +4/5                                               ASSESSMENT   Diagnoses and all orders for this visit:    1. Cervical spondylosis without myelopathy    2. Cervical neuritis    3. Neck pain    4. Spondylolisthesis, acquired        IMPRESSION AND PLAN:  Patient returns to the office today with c/o left shoulder pain that radiates to the LUE to the elbow, and paresthesia to digits 4 and 5. Multiple treatment options were discussed. Patient wished to continue his current treatment. Patient is not interested in restarting Cymbalta. I refilled his Gabapentin 800 mg TID. Patient is neurologically intact. I will see the patient back in 6 month's time or earlier if needed. Written by Radha Brady, as dictated by Reyna Posada MD  I examined the patient, reviewed and agree with the note.

## 2023-03-03 ENCOUNTER — OFFICE VISIT (OUTPATIENT)
Age: 53
End: 2023-03-03

## 2023-03-03 VITALS
RESPIRATION RATE: 20 BRPM | OXYGEN SATURATION: 97 % | HEART RATE: 93 BPM | WEIGHT: 257 LBS | TEMPERATURE: 98.1 F | BODY MASS INDEX: 37.95 KG/M2

## 2023-03-03 DIAGNOSIS — M43.10 SPONDYLOLISTHESIS, ACQUIRED: ICD-10-CM

## 2023-03-03 DIAGNOSIS — M47.812 CERVICAL SPONDYLOSIS WITHOUT MYELOPATHY: Primary | ICD-10-CM

## 2023-03-03 DIAGNOSIS — M54.12 CERVICAL NEURITIS: ICD-10-CM

## 2023-03-03 PROCEDURE — 99214 OFFICE O/P EST MOD 30 MIN: CPT | Performed by: PHYSICAL MEDICINE & REHABILITATION

## 2023-03-03 RX ORDER — DULOXETIN HYDROCHLORIDE 30 MG/1
30 CAPSULE, DELAYED RELEASE ORAL
Qty: 30 CAPSULE | Refills: 1 | Status: SHIPPED | OUTPATIENT
Start: 2023-03-03

## 2023-03-03 RX ORDER — METHYLPREDNISOLONE 4 MG/1
TABLET ORAL
Qty: 1 KIT | Refills: 0 | Status: SHIPPED | OUTPATIENT
Start: 2023-03-03

## 2023-03-03 NOTE — PROGRESS NOTES
[facility]  [address], [place]   Phone: [phone]  Fax: [phone]        PROGRESS NOTE      HISTORY OF PRESENT ILLNESS:  The patient is a 47 y.o. male and was seen today for follow up of left shoulder pain and LUE pain in a C8 distribution to 4th and 5th digit x 7 months after coughing. Pt denies neck pain. Pt states  his pain is alleviated with cervical flexion and reclining back into a recliner. Pt states his pain had been holding steady and has progressed since Christine without specific injury. Pt states he has treated with Flexeril and Prednisone through [facility]. Pt reports temporary relief with the Prednisone, no relief with Flexeril. [facility] records unavailable. Patient denies previous cervical surgery, injections, or physical therapy/chiropractic care. Pt denies fever, weight loss,  or skin changes. Pt denies dropping things or loss of balance. Pt denies dropping things or loss of balance. Patient denies history of glaucoma. Denies DM. Pt is a smoker. The patient is LHD. PmHx  noncontributory. Cervical spine plain films dated 1/4/2022. 2 views: AP and lateral.  Revealed: Slight retrolisthesis of C3 on C4. Mild to moderate disc spacing of C3-4 and C5-6. I increased his Neurontin 600 mg TID to 800 mg TID. Patient was advised to call office if intolerant  to higher dose. At his last clinical appointment, Patient wished to continue with the current treatment plan. Patient was not interested in restarting the Cymbalta. I refilled his Gabapentin 800 mg TID. Patient was last seen on 11/2/2022 and was told to follow up in 6 months, earlier than planned follow up. The patient returns today with pain location and distribution remains unchanged. Patient denies RUE symptoms. He rates his pain 0-9/10, previously 0-3/10.  Patient has had an increase in pain with unchanged pain distribution as the last office visit for the past 2 weeks w/o new injury. Patient says the pain has levelled off at this time. Patient says the left shoulder muscular pain has not settled down. Patient notes a previous left shoulder injury a few years ago. He denies loss of balance, falls, or impairments in manual dexterity. Patient denies DM, stomach ulcers, bleeding disorders, or blood thinners. Patient has been taking OTC Motrin 400 mg PRN with the Gabapentin 800 mg TID.  reviewed. Gabapentin me and medical cannabis Body mass index is 37.95 kg/m². PCP: No primary care provider on file. History reviewed. No pertinent past medical history. Social History     Socioeconomic History    Marital status: Unknown     Spouse name: None    Number of children: None    Years of education: None    Highest education level: None   Tobacco Use    Smoking status: Every Day    Smokeless tobacco: Never   Substance and Sexual Activity    Alcohol use: Never    Drug use: Yes     Types: Marijuana Ryan Sherly)       Current Outpatient Medications   Medication Sig Dispense Refill    gabapentin (NEURONTIN) 800 MG tablet Take 800 mg by mouth 3 times daily. No current facility-administered medications for this visit. Allergies   Allergen Reactions    Penicillins Other (See Comments)     Anaphylaxis          PHYSICAL EXAMINATION    Pulse 93   Temp 98.1 °F (36.7 °C)   Resp 20   Wt 257 lb (116.6 kg)   SpO2 97%   BMI 37.95 kg/m²     CONSTITUTIONAL: NAD, A&O x 3  SENSATION: Sensation is intact to light touch throughout. NEURO: Baljinder's is negative bilaterally. MOTOR:      Ambulates without an assistive device     Shoulder AB/Flex Elbow Flex Wrist Ext Elbow Ext Wrist Flex Hand Intrin Tone   Right +4/5 +4/5 +4/5 +4/5 +4/5 +4/5 +4/5   Left +4/5 +4/5 +4/5 +4/5 +4/5 +4/5 +4/5     ASSESSMENT   Betty Damico was seen today for shoulder pain.     Diagnoses and all orders for this visit:    Cervical spondylosis without myelopathy    Cervical neuritis    Spondylolisthesis, acquired        IMPRESSION AND PLAN:  Patient returns to the office today with c/o left shoulder pain and LUE pain in a C8 distribution to 4th and 5th digit. Multiple treatment options were discussed. I started her on a Medrol Dosepak, followed by OTC Motrin 800 mg TID for 2 weeks after the MDP. Patient does not need a refill of his Gabapentin 800 mg TID. Patient wished to restart the Cymbalta. I will refill his Cymbalta 30 mg QHS. I will give him an out of work note until 3/13/2023. Patient is neurologically intact. I will see the patient back in 1 month's time or earlier if needed. Written by Pablo Ventura, as dictated by Boris Guerrero MD  I examined the patient, reviewed and agree with the note.

## 2023-03-03 NOTE — LETTER
3/3/2023        [address] [place] [id number]      RETURN TO WORK OR SCHOOL    To Whom It May Concern:    Natividad Alford, date of birth 1970, is under the care of [facility] [address] He was seen in our office on 03/03/2023. Please have the patient contact our office if there are questions or concerns.         Sincerely,      Clarissa Burks MD

## 2023-03-03 NOTE — LETTER
3/3/2023        Fabrice Braden  [address]  [facility] [address]      RETURN TO WORK OR SCHOOL    To Whom It May Concern:    Fabrice Braden, date of birth 1970, is under the care of [facility].  He is released to return to work or school on 03/13/2023.    Please have the patient contact our office if there are questions or concerns.      Sincerely,      GABBI ESPINAL III, MD

## 2023-03-15 ENCOUNTER — TELEPHONE (OUTPATIENT)
Age: 53
End: 2023-03-15

## 2023-03-15 DIAGNOSIS — M54.12 CERVICAL NEURITIS: ICD-10-CM

## 2023-03-15 DIAGNOSIS — M54.12 RADICULOPATHY, CERVICAL REGION: ICD-10-CM

## 2023-03-15 DIAGNOSIS — M47.812 CERVICAL SPONDYLOSIS WITHOUT MYELOPATHY: Primary | ICD-10-CM

## 2023-03-15 RX ORDER — GABAPENTIN 800 MG/1
800 TABLET ORAL 3 TIMES DAILY
Qty: 90 TABLET | Refills: 0 | Status: SHIPPED | OUTPATIENT
Start: 2023-03-15 | End: 2023-04-14

## 2023-04-04 ENCOUNTER — TELEPHONE (OUTPATIENT)
Age: 53
End: 2023-04-04

## 2023-05-17 ENCOUNTER — OFFICE VISIT (OUTPATIENT)
Age: 53
End: 2023-05-17

## 2023-05-17 VITALS
RESPIRATION RATE: 17 BRPM | BODY MASS INDEX: 37.36 KG/M2 | TEMPERATURE: 97.1 F | WEIGHT: 253 LBS | HEART RATE: 73 BPM | OXYGEN SATURATION: 95 %

## 2023-05-17 DIAGNOSIS — M54.12 RADICULOPATHY, CERVICAL REGION: Primary | ICD-10-CM

## 2023-05-17 DIAGNOSIS — M43.10 SPONDYLOLISTHESIS, ACQUIRED: ICD-10-CM

## 2023-05-17 DIAGNOSIS — M47.812 CERVICAL SPONDYLOSIS WITHOUT MYELOPATHY: ICD-10-CM

## 2023-05-17 DIAGNOSIS — M54.12 CERVICAL NEURITIS: ICD-10-CM

## 2023-05-17 PROCEDURE — 99214 OFFICE O/P EST MOD 30 MIN: CPT | Performed by: PHYSICAL MEDICINE & REHABILITATION

## 2023-05-17 RX ORDER — PREGABALIN 75 MG/1
75 CAPSULE ORAL 2 TIMES DAILY
Qty: 60 CAPSULE | Refills: 1 | Status: SHIPPED | OUTPATIENT
Start: 2023-05-17 | End: 2023-07-16

## 2023-05-17 RX ORDER — DULOXETIN HYDROCHLORIDE 60 MG/1
60 CAPSULE, DELAYED RELEASE ORAL
Qty: 90 CAPSULE | Refills: 0 | Status: SHIPPED | OUTPATIENT
Start: 2023-05-17

## 2023-05-17 NOTE — PROGRESS NOTES
[facility]  [address], [place]   Phone: [phone]  Fax: [phone]        PROGRESS NOTE      HISTORY OF PRESENT ILLNESS:  The patient is a 47 y.o. male and was seen today for follow up of left shoulder pain and LUE pain in a C8 distribution to 4th and 5th digit x 7 months after coughing. Pt denies neck pain. Pt states  his pain is alleviated with cervical flexion and reclining back into a recliner. Pt states his pain had been holding steady and has progressed since Christine without specific injury. Pt states he has treated with Flexeril and Prednisone through [facility]. Pt reports temporary relief with the Prednisone, no relief with Flexeril. [facility] records unavailable. Patient denies previous cervical surgery, injections, or physical therapy/chiropractic care. Pt denies fever, weight loss,  or skin changes. Pt denies dropping things or loss of balance. Pt denies dropping things or loss of balance. Patient denies history of glaucoma. Denies DM. Pt is a smoker. The patient is LHD. PmHx  noncontributory. Cervical spine plain films dated 1/4/2022. 2 views: AP and lateral.  Revealed: Slight retrolisthesis of C3 on C4. Mild to moderate disc spacing of C3-4 and C5-6. I increased his Neurontin 600 mg TID to 800 mg TID. Patient was advised to call office if intolerant  to higher dose. At his last clinical appointment, I increased his Cymbalta 30 mg QHS to 60 mg QHS. Patient advised to call the office if intolerant to higher dose. I refilled his Gabapentin 800 mg TID. The patient returns today with pain location and distribution remains unchanged. He rates his pain 1-5/10, previously 3-8/10. Patient says that overall his pain is a little better when compared to the last office visit. Patient tolerates the Cymbalta 60 mg QHS with relief. Patient still takes the Gabapentin 800 mg TID.  He denies loss of balance, falls, or impairments in

## 2023-07-05 ENCOUNTER — OFFICE VISIT (OUTPATIENT)
Age: 53
End: 2023-07-05

## 2023-07-05 VITALS
TEMPERATURE: 97.5 F | OXYGEN SATURATION: 97 % | HEIGHT: 69 IN | WEIGHT: 250 LBS | HEART RATE: 71 BPM | RESPIRATION RATE: 18 BRPM | BODY MASS INDEX: 37.03 KG/M2

## 2023-07-05 DIAGNOSIS — M75.41 CORACOID IMPINGEMENT OF RIGHT SHOULDER: Primary | ICD-10-CM

## 2023-07-05 DIAGNOSIS — M54.12 RADICULOPATHY, CERVICAL REGION: ICD-10-CM

## 2023-07-05 DIAGNOSIS — M54.12 CERVICAL NEURITIS: ICD-10-CM

## 2023-07-05 DIAGNOSIS — M47.812 CERVICAL SPONDYLOSIS WITHOUT MYELOPATHY: ICD-10-CM

## 2023-07-05 DIAGNOSIS — M43.12 SPONDYLOLISTHESIS, CERVICAL REGION: ICD-10-CM

## 2023-07-05 PROCEDURE — 99214 OFFICE O/P EST MOD 30 MIN: CPT | Performed by: PHYSICAL MEDICINE & REHABILITATION

## 2023-07-05 RX ORDER — PREGABALIN 75 MG/1
75 CAPSULE ORAL 2 TIMES DAILY
Qty: 60 CAPSULE | Refills: 1 | Status: SHIPPED | OUTPATIENT
Start: 2023-07-05 | End: 2023-09-03

## 2023-07-05 NOTE — PROGRESS NOTES
[facility]  [address]  [address]  [place], [place]  Tel: [phone]  Fax: [phone]          PROGRESS NOTE      HISTORY OF PRESENT ILLNESS:  The patient is a 47 y.o. male and was seen today for follow up of left shoulder pain and LUE pain in a C8 distribution to 4th and 5th digit x 7 months after coughing. Pt denies neck pain. Pt states  his pain is alleviated with cervical flexion and reclining back into a recliner. Pt states his pain had been holding steady and has progressed since Christine without specific injury. Pt states he has treated with Flexeril and Prednisone through [facility]. Pt reports temporary relief with the Prednisone, no relief with Flexeril. [facility] records unavailable. Patient denies previous cervical surgery, injections, or physical therapy/chiropractic care. Pt denies fever, weight loss,  or skin changes. Pt denies dropping things or loss of balance. Pt denies dropping things or loss of balance. Patient denies history of glaucoma. Denies DM. Pt is a smoker. The patient is LHD. PmHx  noncontributory. Cervical spine plain films dated 1/4/2022. 2 views: AP and lateral.  Revealed: Slight retrolisthesis of C3 on C4. Mild to moderate disc spacing of C3-4 and C5-6. At his last clinical appointment, I discussed changing his neuropathic medication, pt wished to continue. I refilled his Cymbalta 60 mg QHS. I weaned him off the Gabapentin 800 mg TID. I tried him on Lyrica 75 mg BID. The patient returns today with pain location and distribution remains unchanged. He rates his pain 0-3/10, previously 1-5/10. Patient says that overall his pain is better when compared to the last office visit. Patient says he did not start the Lyrica 75 mg BID. Patient is no longer taking the Gabapentin 800 mg TID at this time. He denies loss of balance, falls, or impairments in manual dexterity.     Patient notes right shoulder pain to the

## 2023-08-01 ENCOUNTER — OFFICE VISIT (OUTPATIENT)
Age: 53
End: 2023-08-01

## 2023-08-01 VITALS
OXYGEN SATURATION: 94 % | WEIGHT: 250.6 LBS | TEMPERATURE: 98.4 F | BODY MASS INDEX: 37.12 KG/M2 | HEIGHT: 69 IN | HEART RATE: 90 BPM

## 2023-08-01 DIAGNOSIS — M25.511 ACUTE PAIN OF RIGHT SHOULDER: ICD-10-CM

## 2023-08-01 DIAGNOSIS — S46.011A TRAUMATIC TEAR OF RIGHT ROTATOR CUFF, UNSPECIFIED TEAR EXTENT, INITIAL ENCOUNTER: Primary | ICD-10-CM

## 2023-08-01 PROCEDURE — 99214 OFFICE O/P EST MOD 30 MIN: CPT | Performed by: ORTHOPAEDIC SURGERY

## 2023-08-01 PROCEDURE — 73030 X-RAY EXAM OF SHOULDER: CPT | Performed by: ORTHOPAEDIC SURGERY

## 2023-08-01 SDOH — HEALTH STABILITY: PHYSICAL HEALTH: ON AVERAGE, HOW MANY MINUTES DO YOU ENGAGE IN EXERCISE AT THIS LEVEL?: 150+ MIN

## 2023-08-01 SDOH — HEALTH STABILITY: PHYSICAL HEALTH: ON AVERAGE, HOW MANY DAYS PER WEEK DO YOU ENGAGE IN MODERATE TO STRENUOUS EXERCISE (LIKE A BRISK WALK)?: 5 DAYS

## 2023-08-01 ASSESSMENT — SOCIAL DETERMINANTS OF HEALTH (SDOH)
WITHIN THE LAST YEAR, HAVE YOU BEEN AFRAID OF YOUR PARTNER OR EX-PARTNER?: PATIENT DECLINED
WITHIN THE LAST YEAR, HAVE TO BEEN RAPED OR FORCED TO HAVE ANY KIND OF SEXUAL ACTIVITY BY YOUR PARTNER OR EX-PARTNER?: PATIENT DECLINED
WITHIN THE LAST YEAR, HAVE YOU BEEN HUMILIATED OR EMOTIONALLY ABUSED IN OTHER WAYS BY YOUR PARTNER OR EX-PARTNER?: PATIENT DECLINED
WITHIN THE LAST YEAR, HAVE YOU BEEN KICKED, HIT, SLAPPED, OR OTHERWISE PHYSICALLY HURT BY YOUR PARTNER OR EX-PARTNER?: PATIENT DECLINED

## 2023-08-01 NOTE — PROGRESS NOTES
Examination     R   L  ROM   FF  Full   Full  ER  Full   Full   IR  Full   Full  Rotator Cuff Pain/Weakness   Supra  +   -   Infra  +   -   Subscap -   -  Crepitus  -   -  Effusion  -   -  Warmth  -   -   Erythema  -   -  Instability  -   -  AC Joint TTP  -   -  Clavicle   Deformity -   -   TTP  -   -  Proximal Humerus   Deformity -   -   TTP  -   -  Deltoid Strength 5   5  Biceps Strength 5   5  Biceps Deformity -   -  Biceps Groove Pain -   -  Impingement Sign -   -       IMAGING:  Imaging read by myself and interpreted as follows:  X-rays 4 views of the right shoulder were taken in the office today. These show sclerosis of the greater tuberosity concerning for rotator cuff pathology. No other acute or chronic bony abnormalities are noted. ASSESSMENT & PLAN  Diagnosis: Right shoulder rotator cuff pain, possible rotator cuff tear    Phil Richardson has 6 weeks of right shoulder pain after lifting and carrying a heavy object. Since that time he has noticed increasing weakness and increasing pain in his right shoulder. He has difficulty with range of motion and pain and weakness with rotator cuff testing. Despite 6 weeks of conservative treatment including anti-inflammatory injection as well as oral anti-inflammatories and giving this time to settle down the cyst continue to be symptomatic. Therefore I recommended and ordered an MRI of the right shoulder. I will see him back after that is completed. Prescription medication management discussed. Plan was discussed in detail with patient, all questions were answered, and patient voiced understanding of plan. Electronically signed by: Evon Donvoan MD    Note: This note was completed using voice recognition software.   Any typographical/name errors or mistakes are unintentional.

## 2023-08-03 ENCOUNTER — HOSPITAL ENCOUNTER (OUTPATIENT)
Facility: HOSPITAL | Age: 53
Discharge: HOME | End: 2023-08-03
Attending: ORTHOPAEDIC SURGERY

## 2023-08-03 DIAGNOSIS — S46.011A TRAUMATIC TEAR OF RIGHT ROTATOR CUFF, UNSPECIFIED TEAR EXTENT, INITIAL ENCOUNTER: ICD-10-CM

## 2023-08-03 PROCEDURE — 73221 MRI JOINT UPR EXTREM W/O DYE: CPT

## 2023-08-15 ENCOUNTER — OFFICE VISIT (OUTPATIENT)
Age: 53
End: 2023-08-15

## 2023-08-15 VITALS — BODY MASS INDEX: 37.01 KG/M2 | HEIGHT: 69 IN | RESPIRATION RATE: 18 BRPM

## 2023-08-15 DIAGNOSIS — S43.431A SUPERIOR GLENOID LABRUM LESION OF RIGHT SHOULDER, INITIAL ENCOUNTER: ICD-10-CM

## 2023-08-15 DIAGNOSIS — S46.011A TRAUMATIC COMPLETE TEAR OF RIGHT ROTATOR CUFF, INITIAL ENCOUNTER: Primary | ICD-10-CM

## 2023-08-15 PROCEDURE — 99214 OFFICE O/P EST MOD 30 MIN: CPT | Performed by: ORTHOPAEDIC SURGERY

## 2023-08-15 RX ORDER — TRAMADOL HYDROCHLORIDE 50 MG/1
50 TABLET ORAL EVERY 4 HOURS PRN
Qty: 42 TABLET | Refills: 0 | Status: SHIPPED | OUTPATIENT
Start: 2023-08-15 | End: 2023-08-22

## 2023-08-21 ENCOUNTER — OFFICE VISIT (OUTPATIENT)
Age: 53
End: 2023-08-21

## 2023-08-21 VITALS
TEMPERATURE: 97.9 F | BODY MASS INDEX: 37.5 KG/M2 | HEIGHT: 69 IN | OXYGEN SATURATION: 94 % | HEART RATE: 82 BPM | WEIGHT: 253.2 LBS

## 2023-08-21 DIAGNOSIS — M54.12 RADICULOPATHY, CERVICAL REGION: Primary | ICD-10-CM

## 2023-08-21 DIAGNOSIS — M43.12 SPONDYLOLISTHESIS, CERVICAL REGION: ICD-10-CM

## 2023-08-21 DIAGNOSIS — M54.12 CERVICAL NEURITIS: ICD-10-CM

## 2023-08-21 DIAGNOSIS — S46.011A TRAUMATIC COMPLETE TEAR OF RIGHT ROTATOR CUFF, INITIAL ENCOUNTER: ICD-10-CM

## 2023-08-21 DIAGNOSIS — M47.812 CERVICAL SPONDYLOSIS WITHOUT MYELOPATHY: ICD-10-CM

## 2023-08-21 PROCEDURE — 99214 OFFICE O/P EST MOD 30 MIN: CPT | Performed by: PHYSICAL MEDICINE & REHABILITATION

## 2023-08-21 RX ORDER — DULOXETIN HYDROCHLORIDE 60 MG/1
60 CAPSULE, DELAYED RELEASE ORAL
Qty: 90 CAPSULE | Refills: 0 | Status: SHIPPED | OUTPATIENT
Start: 2023-08-21

## 2023-08-21 RX ORDER — PREGABALIN 150 MG/1
150 CAPSULE ORAL 2 TIMES DAILY
Qty: 60 CAPSULE | Refills: 1 | Status: SHIPPED | OUTPATIENT
Start: 2023-08-21 | End: 2023-10-20

## 2023-08-21 NOTE — PROGRESS NOTES
[facility]  [id number]  [address]  [place], [place]  Tel: [phone]  Fax: [phone]          PROGRESS NOTE      HISTORY OF PRESENT ILLNESS:  The patient is a 47 y.o. male and was seen today for follow up of left shoulder pain and LUE pain in a C8 distribution to 4th and 5th digit x 7 months after coughing. Pt denies neck pain. Pt states his pain is alleviated with cervical flexion and reclining back into a recliner. Pt states his pain had been holding steady and has progressed since [place] without specific injury. Pt states he has treated with Flexeril and Prednisone through [facility]. Pt reports temporary relief with the Prednisone, no relief with Flexeril. [facility] records unavailable. Patient denies previous cervical surgery, injections, or physical therapy/chiropractic care. Pt denies fever, weight loss,  or skin changes. Pt denies dropping things or loss of balance. Pt denies dropping things or loss of balance. Patient denies history of glaucoma. Denies DM. Pt is a smoker. The patient is LHD. PmHx  noncontributory. Cervical spine plain films dated 1/4/2022. 2 views: AP and lateral.  Revealed: Slight retrolisthesis of C3 on C4. Mild to moderate disc spacing of C3-4 and C5-6. At his last clinical appointment, I referred him to Dr. Kat Giordano for his right shoulder impingement. I discussed adding the Lyrica to his Cymbalta or keeping it the same. Patient wished to start the Lyrica 75 mg BID. Patient did not need a refill of his Cymbalta 60 mg QHS, but I told him to call the office if he needed a refill before the next office visit. I tried him on Lyrica 75 mg BID. The patient returns today with pain location and distribution remains unchanged. He rates his pain 0-3/10, previously 0-3/10. Patient says that overall his pain is the same when compared to the last office visit. Patient is taking Lyrica 75 mg BID and Cymbalta 60 mg QHS.

## 2023-09-18 ENCOUNTER — OFFICE VISIT (OUTPATIENT)
Age: 53
End: 2023-09-18

## 2023-09-18 VITALS
WEIGHT: 251 LBS | BODY MASS INDEX: 37.07 KG/M2 | HEART RATE: 90 BPM | OXYGEN SATURATION: 95 % | TEMPERATURE: 97.5 F | RESPIRATION RATE: 18 BRPM

## 2023-09-18 DIAGNOSIS — M54.12 CERVICAL NEURITIS: ICD-10-CM

## 2023-09-18 DIAGNOSIS — M47.812 CERVICAL SPONDYLOSIS WITHOUT MYELOPATHY: ICD-10-CM

## 2023-09-18 DIAGNOSIS — M43.12 SPONDYLOLISTHESIS, CERVICAL REGION: ICD-10-CM

## 2023-09-18 DIAGNOSIS — M54.12 RADICULOPATHY, CERVICAL REGION: Primary | ICD-10-CM

## 2023-09-18 PROCEDURE — 99213 OFFICE O/P EST LOW 20 MIN: CPT | Performed by: PHYSICAL MEDICINE & REHABILITATION

## 2023-09-18 RX ORDER — PREGABALIN 150 MG/1
150 CAPSULE ORAL 2 TIMES DAILY
Qty: 60 CAPSULE | Refills: 2 | Status: SHIPPED | OUTPATIENT
Start: 2023-09-18 | End: 2023-12-17

## 2023-09-18 NOTE — PROGRESS NOTES
[facility]  [address]  [address]  [place], [place]  Tel: [phone]  Fax: [phone]          PROGRESS NOTE      HISTORY OF PRESENT ILLNESS:  The patient is a 47 y.o. male and was seen today for follow up of  left shoulder pain and LUE pain in a C8 distribution to 4th and 5th digit x 7 months after coughing. Pt denies neck pain. Pt states his pain is alleviated with cervical flexion and reclining back into a recliner. Pt states his pain had been holding steady and has progressed since [place] without specific injury. Pt states he has treated with Flexeril and Prednisone through [facility]. Pt reports temporary relief with the Prednisone, no relief with Flexeril. [facility] records unavailable. Patient denies previous cervical surgery, injections, or physical therapy/chiropractic care. Pt denies fever, weight loss,  or skin changes. Pt denies dropping things or loss of balance. Pt denies dropping things or loss of balance. Patient denies history of glaucoma. Denies DM. Pt is a smoker. The patient is LHD. PmHx  noncontributory. Note from Dr. Juvenal Winkler dated 8/15/2023 indicating patient was seen with a c/o right shoulder pain. Patient had a full-thickness tear of his supraspinatus that extends into the upper border. Recommended a right arthroscopic rotator cuff repair. Patient wished to moved forward with surgery. Cervical spine plain films dated 1/4/2022. 2 views: AP and lateral.  Revealed: Slight retrolisthesis of C3 on C4. Mild to moderate disc spacing of C3-4 and C5-6. At his last clinical appointment, I refilled his Cymbalta 60 mg QHS. I increased his Lyrica 75 mg BID to 150 mg BID. Patient advised to call the office if intolerant to higher dose. I told him to call the office if he has shoulder surgery a couple of days before the next office visit, so we can do it online.        The patient returns today with pain location and distribution

## 2023-10-06 NOTE — PERIOP NOTE
Instructions for your surgery at [address] [place] [address] Date:  10/6/2023      Patient's Name:  Preeti Loyola           Surgery Date:  10/16/2023              Please enter the main entrance of the hospital and check-in at the  located in the [facility]. Once checked in at the , you will take the elevators to the second floor, and report to the waiting room on the left. The room will say Procedure Registration. Do NOT eat or drink anything, including candy, gum, or ice chips after midnight prior to your surgery, unless you have specific instructions from your surgeon or anesthesia provider to do so. Brush your teeth before coming to the hospital. You may swish with water, but do not swallow. No smoking/Vaping/E-Cigarettes 24 hours prior to the day of surgery. No alcohol 24 hours prior to the day of surgery. No recreational drugs for one week prior to the day of surgery. Bring Photo ID, Insurance information, and Co-pay if required on day of surgery. Bring in pertinent legal documents, such as, Medical Power of MARILY PRO, DNR, Advance Directive, etc.  Leave all valuables, including money/purse, at home. Remove all jewelry, including ALL body piercings, nail polish, acrylic nails, and makeup (including mascara); no lotions, powders, deodorant, or perfume/cologne/after shave on the skin. Follow instruction for Hibiclens washes and CHG wipes from surgeon's office. Glasses and dentures may be worn to the hospital. They must be removed prior to surgery. Please bring case/container for glasses or dentures. Contact lenses should not be worn on day of surgery. Call your doctor's office if symptoms of a cold or illness develop within 24-48 hours prior to your surgery. Call your doctor's office if you have any questions concerning insurance or co-pays. 15. AN ADULT (relative or friend 25 years or older) [address]   16. Please make

## 2023-10-11 ENCOUNTER — OFFICE VISIT (OUTPATIENT)
Age: 53
End: 2023-10-11

## 2023-10-11 VITALS
HEIGHT: 71 IN | WEIGHT: 250 LBS | BODY MASS INDEX: 35 KG/M2 | DIASTOLIC BLOOD PRESSURE: 94 MMHG | SYSTOLIC BLOOD PRESSURE: 134 MMHG | RESPIRATION RATE: 20 BRPM

## 2023-10-11 DIAGNOSIS — S46.011A TRAUMATIC COMPLETE TEAR OF RIGHT ROTATOR CUFF, INITIAL ENCOUNTER: Primary | ICD-10-CM

## 2023-10-11 DIAGNOSIS — S43.431A SUPERIOR GLENOID LABRUM LESION OF RIGHT SHOULDER, INITIAL ENCOUNTER: ICD-10-CM

## 2023-10-11 PROCEDURE — 99024 POSTOP FOLLOW-UP VISIT: CPT | Performed by: ORTHOPAEDIC SURGERY

## 2023-10-11 RX ORDER — OXYCODONE HYDROCHLORIDE AND ACETAMINOPHEN 5; 325 MG/1; MG/1
2 TABLET ORAL EVERY 4 HOURS PRN
Qty: 35 TABLET | Refills: 0 | Status: SHIPPED | OUTPATIENT
Start: 2023-10-11 | End: 2023-10-18

## 2023-10-11 NOTE — PROGRESS NOTES
Erythema                     -                                   -  Instability                     -                                   -  AC Joint TTP               -                                   -  Clavicle              Deformity         -                                   -              TTP                 -                                   -  Proximal Humerus              Deformity         -                                   -              TTP                 -                                   -  Deltoid Strength          5                                  5  Biceps Strength          5                                  5  Biceps Deformity         -                                   -  Biceps Groove Pain    +                                  -  Impingement Sign       -                                   -             Pain with SLAP tear testing right shoulder    IMAGING:  Imaging read by myself and interpreted as follows:      ASSESSMENT & PLAN  Diagnosis: Right shoulder rotator cuff tear and SLAP tear    Jos Mention is here today for his upcoming right shoulder surgery. The surgery was discussed. All of his questions were answered. Pain medication was prescribed. Follow-up postoperatively. Prescription medication management discussed. Plan was discussed in detail with patient, all questions were answered, and patient voiced understanding of plan. Electronically signed by: Evon Donovan MD    Note: This note was completed using voice recognition software.   Any typographical/name errors or mistakes are unintentional.

## 2023-10-13 ENCOUNTER — PREP FOR PROCEDURE (OUTPATIENT)
Age: 53
End: 2023-10-13

## 2023-10-13 ENCOUNTER — ANESTHESIA EVENT (OUTPATIENT)
Facility: HOSPITAL | Age: 53
End: 2023-10-13

## 2023-10-13 RX ORDER — SODIUM CHLORIDE 0.9 % (FLUSH) 0.9 %
5-40 SYRINGE (ML) INJECTION EVERY 12 HOURS SCHEDULED
Status: CANCELLED | OUTPATIENT
Start: 2023-10-13

## 2023-10-13 RX ORDER — SODIUM CHLORIDE 0.9 % (FLUSH) 0.9 %
5-40 SYRINGE (ML) INJECTION PRN
Status: CANCELLED | OUTPATIENT
Start: 2023-10-13

## 2023-10-13 RX ORDER — CELECOXIB 100 MG/1
200 CAPSULE ORAL
Status: CANCELLED | OUTPATIENT
Start: 2023-10-13 | End: 2023-10-14

## 2023-10-13 RX ORDER — SODIUM CHLORIDE 9 MG/ML
INJECTION, SOLUTION INTRAVENOUS PRN
Status: CANCELLED | OUTPATIENT
Start: 2023-10-13

## 2023-10-13 RX ORDER — GABAPENTIN 100 MG/1
300 CAPSULE ORAL
Status: CANCELLED | OUTPATIENT
Start: 2023-10-13 | End: 2023-10-14

## 2023-10-13 RX ORDER — CLINDAMYCIN PHOSPHATE 600 MG/50ML
600 INJECTION, SOLUTION INTRAVENOUS EVERY 8 HOURS
Status: DISCONTINUED | OUTPATIENT
Start: 2023-10-13 | End: 2023-10-13

## 2023-10-16 ENCOUNTER — ANESTHESIA (OUTPATIENT)
Facility: HOSPITAL | Age: 53
End: 2023-10-16

## 2023-10-16 ENCOUNTER — HOSPITAL ENCOUNTER (OUTPATIENT)
Facility: HOSPITAL | Age: 53
Setting detail: OUTPATIENT SURGERY
Discharge: HOME | End: 2023-10-16
Attending: ORTHOPAEDIC SURGERY | Admitting: ORTHOPAEDIC SURGERY

## 2023-10-16 VITALS
SYSTOLIC BLOOD PRESSURE: 115 MMHG | BODY MASS INDEX: 35.42 KG/M2 | OXYGEN SATURATION: 92 % | TEMPERATURE: 97.9 F | RESPIRATION RATE: 18 BRPM | DIASTOLIC BLOOD PRESSURE: 85 MMHG | HEIGHT: 71 IN | WEIGHT: 253 LBS | HEART RATE: 71 BPM

## 2023-10-16 LAB
AMPHET UR QL SCN: NEGATIVE
BARBITURATES UR QL SCN: NEGATIVE
BENZODIAZ UR QL: NEGATIVE
CANNABINOIDS UR QL SCN: POSITIVE
COCAINE UR QL SCN: NEGATIVE
Lab: ABNORMAL
OPIATES UR QL: NEGATIVE
PCP UR QL: NEGATIVE

## 2023-10-16 PROCEDURE — 2580000003 HC RX 258: Performed by: ORTHOPAEDIC SURGERY

## 2023-10-16 PROCEDURE — 6370000000 HC RX 637 (ALT 250 FOR IP): Performed by: ORTHOPAEDIC SURGERY

## 2023-10-16 PROCEDURE — 3700000001 HC ADD 15 MINUTES (ANESTHESIA): Performed by: ORTHOPAEDIC SURGERY

## 2023-10-16 PROCEDURE — A4217 STERILE WATER/SALINE, 500 ML: HCPCS | Performed by: ORTHOPAEDIC SURGERY

## 2023-10-16 PROCEDURE — 6360000002 HC RX W HCPCS: Performed by: ORTHOPAEDIC SURGERY

## 2023-10-16 PROCEDURE — 6360000002 HC RX W HCPCS: Performed by: NURSE ANESTHETIST, CERTIFIED REGISTERED

## 2023-10-16 PROCEDURE — 7100000010 HC PHASE II RECOVERY - FIRST 15 MIN: Performed by: ORTHOPAEDIC SURGERY

## 2023-10-16 PROCEDURE — C1713 ANCHOR/SCREW BN/BN,TIS/BN: HCPCS | Performed by: ORTHOPAEDIC SURGERY

## 2023-10-16 PROCEDURE — 2709999900 HC NON-CHARGEABLE SUPPLY: Performed by: ORTHOPAEDIC SURGERY

## 2023-10-16 PROCEDURE — 7100000001 HC PACU RECOVERY - ADDTL 15 MIN: Performed by: ORTHOPAEDIC SURGERY

## 2023-10-16 PROCEDURE — 2580000003 HC RX 258: Performed by: NURSE ANESTHETIST, CERTIFIED REGISTERED

## 2023-10-16 PROCEDURE — 80307 DRUG TEST PRSMV CHEM ANLYZR: CPT

## 2023-10-16 PROCEDURE — L3650 SO 8 ABD RESTRAINT PRE OTS: HCPCS | Performed by: ORTHOPAEDIC SURGERY

## 2023-10-16 PROCEDURE — 2720000010 HC SURG SUPPLY STERILE: Performed by: ORTHOPAEDIC SURGERY

## 2023-10-16 PROCEDURE — 6370000000 HC RX 637 (ALT 250 FOR IP): Performed by: NURSE ANESTHETIST, CERTIFIED REGISTERED

## 2023-10-16 PROCEDURE — 64415 NJX AA&/STRD BRCH PLXS IMG: CPT | Performed by: ANESTHESIOLOGY

## 2023-10-16 PROCEDURE — 7100000000 HC PACU RECOVERY - FIRST 15 MIN: Performed by: ORTHOPAEDIC SURGERY

## 2023-10-16 PROCEDURE — 3600000012 HC SURGERY LEVEL 2 ADDTL 15MIN: Performed by: ORTHOPAEDIC SURGERY

## 2023-10-16 PROCEDURE — 6360000002 HC RX W HCPCS: Performed by: ANESTHESIOLOGY

## 2023-10-16 PROCEDURE — 2500000003 HC RX 250 WO HCPCS: Performed by: NURSE ANESTHETIST, CERTIFIED REGISTERED

## 2023-10-16 PROCEDURE — 7100000011 HC PHASE II RECOVERY - ADDTL 15 MIN: Performed by: ORTHOPAEDIC SURGERY

## 2023-10-16 PROCEDURE — 3700000000 HC ANESTHESIA ATTENDED CARE: Performed by: ORTHOPAEDIC SURGERY

## 2023-10-16 PROCEDURE — 3600000002 HC SURGERY LEVEL 2 BASE: Performed by: ORTHOPAEDIC SURGERY

## 2023-10-16 DEVICE — ANCHOR SUTURE BIOCOMP 4.75X19.1 MM SWIVELOCK C: Type: IMPLANTABLE DEVICE | Status: FUNCTIONAL

## 2023-10-16 DEVICE — ANCHOR SUT L14.7MM DIA5.5MM BIOCOMPOSITE FULL THRD W/ 1.3MM: Type: IMPLANTABLE DEVICE | Status: FUNCTIONAL

## 2023-10-16 DEVICE — ANCHOR SUT L19.1MM DIA7MM BIOCOMPOSITE SWIVELOCK TENODESIS: Type: IMPLANTABLE DEVICE | Status: FUNCTIONAL

## 2023-10-16 RX ORDER — NEOSTIGMINE METHYLSULFATE 1 MG/ML
INJECTION, SOLUTION INTRAVENOUS PRN
Status: DISCONTINUED | OUTPATIENT
Start: 2023-10-16 | End: 2023-10-16 | Stop reason: SDUPTHER

## 2023-10-16 RX ORDER — MIDAZOLAM HYDROCHLORIDE 2 MG/2ML
2 INJECTION, SOLUTION INTRAMUSCULAR; INTRAVENOUS ONCE
Status: DISCONTINUED | OUTPATIENT
Start: 2023-10-16 | End: 2023-10-16 | Stop reason: HOSPADM

## 2023-10-16 RX ORDER — FENTANYL CITRATE 50 UG/ML
INJECTION, SOLUTION INTRAMUSCULAR; INTRAVENOUS
Status: COMPLETED | OUTPATIENT
Start: 2023-10-16 | End: 2023-10-16

## 2023-10-16 RX ORDER — ROPIVACAINE HYDROCHLORIDE 5 MG/ML
30 INJECTION, SOLUTION EPIDURAL; INFILTRATION; PERINEURAL ONCE
Status: COMPLETED | OUTPATIENT
Start: 2023-10-16 | End: 2023-10-16

## 2023-10-16 RX ORDER — LIDOCAINE HYDROCHLORIDE 10 MG/ML
1 INJECTION, SOLUTION EPIDURAL; INFILTRATION; INTRACAUDAL; PERINEURAL
Status: DISCONTINUED | OUTPATIENT
Start: 2023-10-16 | End: 2023-10-16 | Stop reason: HOSPADM

## 2023-10-16 RX ORDER — SODIUM CHLORIDE 0.9 % (FLUSH) 0.9 %
5-40 SYRINGE (ML) INJECTION EVERY 12 HOURS SCHEDULED
Status: DISCONTINUED | OUTPATIENT
Start: 2023-10-16 | End: 2023-10-16 | Stop reason: HOSPADM

## 2023-10-16 RX ORDER — ONDANSETRON 2 MG/ML
4 INJECTION INTRAMUSCULAR; INTRAVENOUS
Status: DISCONTINUED | OUTPATIENT
Start: 2023-10-16 | End: 2023-10-16 | Stop reason: HOSPADM

## 2023-10-16 RX ORDER — FENTANYL CITRATE 50 UG/ML
50 INJECTION, SOLUTION INTRAMUSCULAR; INTRAVENOUS EVERY 5 MIN PRN
Status: DISCONTINUED | OUTPATIENT
Start: 2023-10-16 | End: 2023-10-16 | Stop reason: HOSPADM

## 2023-10-16 RX ORDER — DEXAMETHASONE SODIUM PHOSPHATE 4 MG/ML
INJECTION, SOLUTION INTRA-ARTICULAR; INTRALESIONAL; INTRAMUSCULAR; INTRAVENOUS; SOFT TISSUE PRN
Status: DISCONTINUED | OUTPATIENT
Start: 2023-10-16 | End: 2023-10-16 | Stop reason: SDUPTHER

## 2023-10-16 RX ORDER — FAMOTIDINE 20 MG/1
20 TABLET, FILM COATED ORAL ONCE
Status: COMPLETED | OUTPATIENT
Start: 2023-10-16 | End: 2023-10-16

## 2023-10-16 RX ORDER — SODIUM CHLORIDE, SODIUM LACTATE, POTASSIUM CHLORIDE, CALCIUM CHLORIDE 600; 310; 30; 20 MG/100ML; MG/100ML; MG/100ML; MG/100ML
INJECTION, SOLUTION INTRAVENOUS CONTINUOUS
Status: DISCONTINUED | OUTPATIENT
Start: 2023-10-16 | End: 2023-10-16 | Stop reason: HOSPADM

## 2023-10-16 RX ORDER — SUCCINYLCHOLINE/SOD CL,ISO/PF 100 MG/5ML
SYRINGE (ML) INTRAVENOUS PRN
Status: DISCONTINUED | OUTPATIENT
Start: 2023-10-16 | End: 2023-10-16 | Stop reason: SDUPTHER

## 2023-10-16 RX ORDER — LABETALOL HYDROCHLORIDE 5 MG/ML
10 INJECTION, SOLUTION INTRAVENOUS
Status: DISCONTINUED | OUTPATIENT
Start: 2023-10-16 | End: 2023-10-16 | Stop reason: HOSPADM

## 2023-10-16 RX ORDER — ONDANSETRON 2 MG/ML
INJECTION INTRAMUSCULAR; INTRAVENOUS PRN
Status: DISCONTINUED | OUTPATIENT
Start: 2023-10-16 | End: 2023-10-16 | Stop reason: SDUPTHER

## 2023-10-16 RX ORDER — FENTANYL CITRATE 50 UG/ML
100 INJECTION, SOLUTION INTRAMUSCULAR; INTRAVENOUS ONCE
Status: DISCONTINUED | OUTPATIENT
Start: 2023-10-16 | End: 2023-10-16 | Stop reason: HOSPADM

## 2023-10-16 RX ORDER — GLYCOPYRROLATE 0.2 MG/ML
INJECTION INTRAMUSCULAR; INTRAVENOUS PRN
Status: DISCONTINUED | OUTPATIENT
Start: 2023-10-16 | End: 2023-10-16 | Stop reason: SDUPTHER

## 2023-10-16 RX ORDER — PROPOFOL 10 MG/ML
INJECTION, EMULSION INTRAVENOUS PRN
Status: DISCONTINUED | OUTPATIENT
Start: 2023-10-16 | End: 2023-10-16 | Stop reason: SDUPTHER

## 2023-10-16 RX ORDER — GABAPENTIN 300 MG/1
300 CAPSULE ORAL
Status: COMPLETED | OUTPATIENT
Start: 2023-10-16 | End: 2023-10-16

## 2023-10-16 RX ORDER — SODIUM CHLORIDE 9 MG/ML
INJECTION, SOLUTION INTRAVENOUS
Status: DISCONTINUED
Start: 2023-10-16 | End: 2023-10-16 | Stop reason: HOSPADM

## 2023-10-16 RX ORDER — SODIUM CHLORIDE 9 MG/ML
INJECTION, SOLUTION INTRAVENOUS PRN
Status: DISCONTINUED | OUTPATIENT
Start: 2023-10-16 | End: 2023-10-16 | Stop reason: HOSPADM

## 2023-10-16 RX ORDER — SODIUM CHLORIDE 0.9 % (FLUSH) 0.9 %
5-40 SYRINGE (ML) INJECTION PRN
Status: DISCONTINUED | OUTPATIENT
Start: 2023-10-16 | End: 2023-10-16 | Stop reason: HOSPADM

## 2023-10-16 RX ORDER — MIDAZOLAM HYDROCHLORIDE 1 MG/ML
INJECTION INTRAMUSCULAR; INTRAVENOUS
Status: COMPLETED | OUTPATIENT
Start: 2023-10-16 | End: 2023-10-16

## 2023-10-16 RX ORDER — ROCURONIUM BROMIDE 10 MG/ML
INJECTION, SOLUTION INTRAVENOUS PRN
Status: DISCONTINUED | OUTPATIENT
Start: 2023-10-16 | End: 2023-10-16 | Stop reason: SDUPTHER

## 2023-10-16 RX ORDER — LIDOCAINE HYDROCHLORIDE 20 MG/ML
INJECTION, SOLUTION EPIDURAL; INFILTRATION; INTRACAUDAL; PERINEURAL PRN
Status: DISCONTINUED | OUTPATIENT
Start: 2023-10-16 | End: 2023-10-16 | Stop reason: SDUPTHER

## 2023-10-16 RX ORDER — FENTANYL CITRATE 50 UG/ML
INJECTION, SOLUTION INTRAMUSCULAR; INTRAVENOUS PRN
Status: DISCONTINUED | OUTPATIENT
Start: 2023-10-16 | End: 2023-10-16 | Stop reason: SDUPTHER

## 2023-10-16 RX ORDER — CELECOXIB 100 MG/1
200 CAPSULE ORAL
Status: COMPLETED | OUTPATIENT
Start: 2023-10-16 | End: 2023-10-16

## 2023-10-16 RX ADMIN — GLYCOPYRROLATE 0.4 MG: 0.2 INJECTION INTRAMUSCULAR; INTRAVENOUS at 09:14

## 2023-10-16 RX ADMIN — ONDANSETRON 4 MG: 2 INJECTION INTRAMUSCULAR; INTRAVENOUS at 09:12

## 2023-10-16 RX ADMIN — ROCURONIUM BROMIDE 10 MG: 10 INJECTION, SOLUTION INTRAVENOUS at 08:45

## 2023-10-16 RX ADMIN — FENTANYL CITRATE 100 MCG: 50 INJECTION, SOLUTION INTRAMUSCULAR; INTRAVENOUS at 07:30

## 2023-10-16 RX ADMIN — PROPOFOL 150 MG: 10 INJECTION, EMULSION INTRAVENOUS at 07:34

## 2023-10-16 RX ADMIN — LIDOCAINE HYDROCHLORIDE 100 MG: 20 INJECTION, SOLUTION EPIDURAL; INFILTRATION; INTRACAUDAL; PERINEURAL at 07:34

## 2023-10-16 RX ADMIN — SODIUM CHLORIDE, POTASSIUM CHLORIDE, SODIUM LACTATE AND CALCIUM CHLORIDE: 600; 310; 30; 20 INJECTION, SOLUTION INTRAVENOUS at 07:04

## 2023-10-16 RX ADMIN — FENTANYL CITRATE 100 MCG: 50 INJECTION INTRAMUSCULAR; INTRAVENOUS at 07:07

## 2023-10-16 RX ADMIN — PROPOFOL 50 MG: 10 INJECTION, EMULSION INTRAVENOUS at 08:27

## 2023-10-16 RX ADMIN — Medication 80 MG: at 07:34

## 2023-10-16 RX ADMIN — VANCOMYCIN HYDROCHLORIDE 1000 MG: 1 INJECTION, POWDER, LYOPHILIZED, FOR SOLUTION INTRAVENOUS at 07:04

## 2023-10-16 RX ADMIN — ROCURONIUM BROMIDE 5 MG: 10 INJECTION, SOLUTION INTRAVENOUS at 07:34

## 2023-10-16 RX ADMIN — GABAPENTIN 300 MG: 300 CAPSULE ORAL at 06:56

## 2023-10-16 RX ADMIN — ROCURONIUM BROMIDE 25 MG: 10 INJECTION, SOLUTION INTRAVENOUS at 07:40

## 2023-10-16 RX ADMIN — FAMOTIDINE 20 MG: 20 TABLET, FILM COATED ORAL at 06:56

## 2023-10-16 RX ADMIN — DEXAMETHASONE SODIUM PHOSPHATE 4 MG: 4 INJECTION, SOLUTION INTRAMUSCULAR; INTRAVENOUS at 07:45

## 2023-10-16 RX ADMIN — CELECOXIB 200 MG: 100 CAPSULE ORAL at 06:56

## 2023-10-16 RX ADMIN — Medication 3 MG: at 09:14

## 2023-10-16 RX ADMIN — MIDAZOLAM 2 MG: 1 INJECTION, SOLUTION INTRAMUSCULAR; INTRAVENOUS at 07:07

## 2023-10-16 RX ADMIN — ROCURONIUM BROMIDE 20 MG: 10 INJECTION, SOLUTION INTRAVENOUS at 08:27

## 2023-10-16 RX ADMIN — ROCURONIUM BROMIDE 10 MG: 10 INJECTION, SOLUTION INTRAVENOUS at 08:59

## 2023-10-16 RX ADMIN — ROPIVACAINE HYDROCHLORIDE 30 ML: 5 INJECTION EPIDURAL; INFILTRATION; PERINEURAL at 07:05

## 2023-10-16 ASSESSMENT — PAIN - FUNCTIONAL ASSESSMENT: PAIN_FUNCTIONAL_ASSESSMENT: 0-10

## 2023-10-16 NOTE — OP NOTE
interference fit screw into the bicipital groove. Attention was then returned to the supraspinatus rotator cuff tear. This was an L-shaped tear. The undersurface of the tear was debrided. The tear was mobilized to the greater tuberosity. The exposed portion of the greater tuberosity was debrided and burred to bleeding bone. 2 suture anchors were placed at the articular margin of the greater tuberosity. The suture tails were passed through the supraspinatus tear, tied, and 1 limb of each suture was brought down to 2 separate lateral row anchors completing the repair. Final pictures were taken. All loose bony and soft tissue debris was removed from the subacromial space. The scope instruments were removed. The portal sites were closed. A sterile dressing was placed. The right arm was placed into a sling. The patient was then awakened from anesthesia, extubated, and taken to PACU in stable condition with a plan for discharge home.     Electronically signed by Stewart Dave MD on 10/16/2023 at 9:45 AM

## 2023-10-16 NOTE — BRIEF OP NOTE
Brief Postoperative Note      Patient: India Daigle  YOB: 1970  MRN: [id number]    Date of Procedure: 10/16/2023    Pre-Op Diagnosis Codes:     * Traumatic tear of right rotator cuff, unspecified tear extent, initial encounter [S46.011A]    SLAP Tear    Post-Op Diagnosis: Same       Procedure(s):  RIGHT SHOULDER ARTHROSCOPIC ROTATOR CUFF REPAIR; BICEPS TENODESIS [[employer]]; SPIDER, TMAX; NERVE BLOCK    Surgeon(s):  Lexus Mendes MD    Assistant:  Surgical Assistant: Chasity Connor    Anesthesia: General    Estimated Blood Loss (mL): Minimal    Complications: None    Specimens:   * No specimens in log *    Implants:  Implant Name Type Inv. Item Serial No.  Lot No. LRB No. Used Action   ANCHOR SUTURE BIOCOMP 4.75X19.1 MM [employer]IVELOCK C - [id number]  ANCHOR SUTURE BIOCOMP 4.75X19.1 MM José Piggs [id number] [employer] [id number] Right 1 Implanted   ANCHOR SUT L19.1MM DIA7MM BIOCOMPOSITE SWIVELOCK TENODESIS - [id number]  ANCHOR SUT L19.1MM DIA7MM BIOCOMPOSITE SWIVELOCK TENODESIS [id number] [employer] [id number] Right 1 Implanted   ANCHOR SUT L14.7MM DIA5. 5MM BIOCOMPOSITE FULL THRD W/ 1.3MM - [id number]  ANCHOR SUT L14.7MM DIA5. 5MM BIOCOMPOSITE FULL THRD W/ 1.3MM [id number] [employer] [id number] Right 1 Implanted   ANCHOR SUT L14.7MM DIA5. 5MM BIOCOMPOSITE FULL THRD W/ 1.3MM - [id number]  ANCHOR SUT L14.7MM DIA5. 5MM BIOCOMPOSITE FULL THRD W/ 1.3MM [id number] [employer] [id number] Right 1 Implanted   ANCHOR SUTURE BIOCOMP 4.75X19.1 MM SWIVELOCK C - [id number]  ANCHOR SUTURE BIOCOMP 4.75X19.1 MM [employer]WD [id number] Right 1 Implanted   ANCHOR SUTURE BIOCOMP 4.75X19.1 MM SWIVELOCK C - [id number]  ANCHOR SUTURE BIOCOMP 4.75X19.1 MM José Piggs [id number] [employer] [id number] Right 1 Implanted         Drains: * No LDAs found *    Findings: Right shoulder Rotator cuff tear, SLAP tear      Electronically signed by Lexus Mendes MD on 10/16/2023

## 2023-10-16 NOTE — ANESTHESIA POSTPROCEDURE EVALUATION
Department of Anesthesiology  Postprocedure Note    Patient: Daniel Ward  MRN: [id number]  YOB: 1970  Date of evaluation: 10/16/2023      Procedure Summary     Date: 10/16/23 Room / Location: [facility] MAIN 05 / [facility] MAIN OR    Anesthesia Start: 0730 Anesthesia Stop: 4239    Procedure: RIGHT SHOULDER ARTHROSCOPIC ROTATOR CUFF REPAIR; BICEPS TENODESIS [[employer]];  SPIDER, TMAX; NERVE BLOCK (Right: Shoulder) Diagnosis:       Traumatic tear of right rotator cuff, unspecified tear extent, initial encounter      (Traumatic tear of right rotator cuff, unspecified tear extent, initial encounter [S46.011A])    Surgeons: Brittany Esteban MD Responsible Provider: Ciaran Gonzalez MD    Anesthesia Type: General ASA Status: 2          Anesthesia Type: General    Gala Phase I: Gala Score: 10    Gala Phase II: Gala Score: 10      Anesthesia Post Evaluation    Patient location during evaluation: bedside  Patient participation: complete - patient participated  Level of consciousness: responsive to verbal stimuli  Airway patency: patent  Nausea & Vomiting: no nausea  Complications: no  Respiratory status: acceptable  Hydration status: euvolemic

## 2023-10-16 NOTE — INTERVAL H&P NOTE
Update History & Physical    The patient's History and Physical of October 11, 2023 was reviewed with the patient and I examined the patient. There was no change. The surgical site was confirmed by the patient and me. Plan: The risks, benefits, expected outcome, and alternative to the recommended procedure have been discussed with the patient. Patient understands and wants to proceed with the procedure.      Electronically signed by Db Aranda MD on 10/16/2023 at 7:08 AM

## 2023-10-16 NOTE — PERIOP NOTE
Patient Mayra Christopher has been informed that [facility] is not responsible for patient belongings per policy and the signed [facility] Patient Agreement document. Personal items should be sent home or checked in with security. Patient Mayra Christopher selected the following action:                            []  Send personal items home with a family member or friend                                                 []  Check in personal items with security, excluding clothing                            [x]  Maintain personal items at the bedside, against recommendation                                 by [facility]                                   ** If patient Sarbjit Leary chooses to maintain personal items at the bedside,                                      Complete the patient belongings inventory in the EMR. Belongings are in [address].   Patient transportation: father, Monalisa Koyanagi; number: [phone]

## 2023-10-16 NOTE — DISCHARGE INSTRUCTIONS
DISCHARGE SUMMARY from Nurse    PATIENT INSTRUCTIONS:    After general anesthesia or intravenous sedation, for 24 hours or while taking prescription Narcotics:  Limit your activities  Do not drive and operate hazardous machinery  Do not make important personal or business decisions  Do  not drink alcoholic beverages  If you have not urinated within 8 hours after discharge, please contact your surgeon on call. Report the following to your surgeon:  Excessive pain, swelling, redness or odor of or around the surgical area  Temperature over 100.5  Nausea and vomiting lasting longer than 4 hours or if unable to take medications  Any signs of decreased circulation or nerve impairment to extremity: change in color, persistent  numbness, tingling, coldness or increase pain  Any questions        These are general instructions for a healthy lifestyle:    No smoking/ No tobacco products/ Avoid exposure to second hand smoke  Surgeon General's Warning:  Quitting smoking now greatly reduces serious risk to your health. Obesity, smoking, and sedentary lifestyle greatly increases your risk for illness    A healthy diet, regular physical exercise & weight monitoring are important for maintaining a healthy lifestyle    You may be retaining fluid if you have a history of heart failure or if you experience any of the following symptoms:  Weight gain of 3 pounds or more overnight or 5 pounds in a week, increased swelling in our hands or feet or shortness of breath while lying flat in bed. Please call your doctor as soon as you notice any of these symptoms; do not wait until your next office visit. The discharge information has been reviewed with the patient. The patient verbalized understanding.   Discharge medications reviewed with the patient and appropriate educational materials and side effects teaching were

## 2023-10-16 NOTE — ANESTHESIA PROCEDURE NOTES
Peripheral Block    Patient location during procedure: pre-op  Reason for block: post-op pain management and at surgeon's request  Start time: 10/16/2023 7:07 AM  End time: 10/16/2023 7:18 AM  Staffing  Performed: anesthesiologist   Anesthesiologist: Tatyana Louise MD  Performed by: Tatyana Louise MD  Authorized by: Tatyana Louise MD    Preanesthetic Checklist  Completed: patient identified, IV checked, site marked, risks and benefits discussed, surgical/procedural consents, equipment checked, pre-op evaluation, timeout performed, anesthesia consent given, oxygen available, monitors applied/VS acknowledged, fire risk safety assessment completed and verbalized and blood product R/B/A discussed and consented  Peripheral Block   Patient position: supine  Prep: ChloraPrep  Provider prep: sterile gloves and mask  Patient monitoring: cardiac monitor, continuous pulse ox, continuous capnometry, frequent blood pressure checks, IV access, oxygen and responsive to questions  Block type: Brachial plexus  Interscalene  Laterality: right  Injection technique: single-shot  Guidance: ultrasound guided  Local infiltration: ropivacaine  Infiltration strength: 0.5 %  Local infiltration: ropivacaine  Dose: 30 mL    Needle   Needle type: Quincke   Needle gauge: 22 G  Needle localization: ultrasound guidance  Needle length: 5 cm  Assessment   Injection assessment: negative aspiration for heme, no paresthesia on injection, local visualized surrounding nerve on ultrasound, no intravascular symptoms and low pressure verified by pressure monitor  Paresthesia pain: none  Slow fractionated injection: yes  Hemodynamics: stable  Real-time US image taken/store: yes  Outcomes: uncomplicated    Medications Administered  fentaNYL (SUBLIMAZE) injection - IntraVENous   100 mcg - 10/16/2023 7:07:00 AM  midazolam (VERSED) injection 2 mg/2mL - IntraVENous   2 mg - 10/16/2023 7:07:00 AM

## 2023-10-25 ENCOUNTER — OFFICE VISIT (OUTPATIENT)
Age: 53
End: 2023-10-25

## 2023-10-25 DIAGNOSIS — S46.011A TRAUMATIC COMPLETE TEAR OF RIGHT ROTATOR CUFF, INITIAL ENCOUNTER: Primary | ICD-10-CM

## 2023-10-25 PROCEDURE — 99024 POSTOP FOLLOW-UP VISIT: CPT | Performed by: ORTHOPAEDIC SURGERY

## 2023-10-25 RX ORDER — OXYCODONE HYDROCHLORIDE AND ACETAMINOPHEN 5; 325 MG/1; MG/1
1 TABLET ORAL EVERY 6 HOURS PRN
Qty: 28 TABLET | Refills: 0 | Status: SHIPPED | OUTPATIENT
Start: 2023-10-25 | End: 2023-11-01

## 2023-10-25 NOTE — PROGRESS NOTES
was refilled. Follow-up in 3 weeks. Prescription medication management discussed with patient. Plan was discussed in detail with patient, all questions were answered, and patient voiced understanding of plan. Electronically signed by: Soraida Rasheed MD     Note: This note was completed using voice recognition software.   Any typographical/name errors or mistakes are unintentional.

## 2023-11-07 RX ORDER — DULOXETIN HYDROCHLORIDE 60 MG/1
60 CAPSULE, DELAYED RELEASE ORAL DAILY
Qty: 90 CAPSULE | Refills: 0 | Status: SHIPPED | OUTPATIENT
Start: 2023-11-07

## 2023-11-07 RX ORDER — DULOXETIN HYDROCHLORIDE 30 MG/1
CAPSULE, DELAYED RELEASE ORAL DAILY
Qty: 30 CAPSULE | Refills: 1 | OUTPATIENT
Start: 2023-11-07

## 2023-11-17 ENCOUNTER — OFFICE VISIT (OUTPATIENT)
Age: 53
End: 2023-11-17

## 2023-11-17 DIAGNOSIS — S46.011A TRAUMATIC COMPLETE TEAR OF RIGHT ROTATOR CUFF, INITIAL ENCOUNTER: Primary | ICD-10-CM

## 2023-11-17 PROCEDURE — 99024 POSTOP FOLLOW-UP VISIT: CPT | Performed by: ORTHOPAEDIC SURGERY

## 2023-11-17 RX ORDER — OXYCODONE HYDROCHLORIDE AND ACETAMINOPHEN 5; 325 MG/1; MG/1
1 TABLET ORAL EVERY 6 HOURS PRN
Qty: 28 TABLET | Refills: 0 | Status: SHIPPED | OUTPATIENT
Start: 2023-11-17 | End: 2023-11-24

## 2023-11-17 NOTE — PROGRESS NOTES
of plan. Electronically signed by: Braulio Jain MD     Note: This note was completed using voice recognition software.   Any typographical/name errors or mistakes are unintentional.

## 2023-12-15 ENCOUNTER — OFFICE VISIT (OUTPATIENT)
Age: 53
End: 2023-12-15

## 2023-12-15 VITALS — HEIGHT: 71 IN | BODY MASS INDEX: 35.29 KG/M2

## 2023-12-15 DIAGNOSIS — S46.011D TRAUMATIC COMPLETE TEAR OF RIGHT ROTATOR CUFF, SUBSEQUENT ENCOUNTER: Primary | ICD-10-CM

## 2023-12-15 PROCEDURE — 99024 POSTOP FOLLOW-UP VISIT: CPT | Performed by: ORTHOPAEDIC SURGERY

## 2024-03-20 RX ORDER — DULOXETIN HYDROCHLORIDE 60 MG/1
CAPSULE, DELAYED RELEASE ORAL DAILY
Qty: 90 CAPSULE | Refills: 0 | Status: SHIPPED | OUTPATIENT
Start: 2024-03-20

## 2024-03-20 RX ORDER — DULOXETIN HYDROCHLORIDE 60 MG/1
60 CAPSULE, DELAYED RELEASE ORAL DAILY
Qty: 90 CAPSULE | Refills: 0 | OUTPATIENT
Start: 2024-03-20

## 2024-06-21 ENCOUNTER — OFFICE VISIT (OUTPATIENT)
Age: 54
End: 2024-06-21
Payer: COMMERCIAL

## 2024-06-21 VITALS — HEIGHT: 61 IN | TEMPERATURE: 98 F | BODY MASS INDEX: 46.44 KG/M2 | WEIGHT: 246 LBS

## 2024-06-21 DIAGNOSIS — M54.12 RADICULOPATHY, CERVICAL REGION: Primary | ICD-10-CM

## 2024-06-21 DIAGNOSIS — M43.12 SPONDYLOLISTHESIS, CERVICAL REGION: ICD-10-CM

## 2024-06-21 DIAGNOSIS — M47.812 CERVICAL SPONDYLOSIS WITHOUT MYELOPATHY: ICD-10-CM

## 2024-06-21 PROCEDURE — 99213 OFFICE O/P EST LOW 20 MIN: CPT | Performed by: PHYSICAL MEDICINE & REHABILITATION

## 2024-06-21 NOTE — PROGRESS NOTES
previously 0/10. Patient says that overall his pain is the same when compared to the last office visit. He continues taking the Cymbalta 60 mg QHS. He self d/c'd the Lyrica 150 mg BID for around a month secondary to cost. He denies loss of balance, falls, or impairments in manual dexterity. He underwent right rotator cuff repair with Dr. Francois on 10/16/2023. He is performing the HEP at this time for his ROM. At his last clinical appointment, I discussed decreasing his Cymbalta, pt declined. He does not need a refill of his Cymbalta 60 mg QHS. I told him to call the office when he needs a refill before the next office visit. Patient is neurologically intact. I will see the patient back in 6 months or earlier if needed.       The patient returns today with no pain He rates his pain  0 /10, previously 0/10. His only complaint today is numbness in the left hand. Patient says that overall his pain is the same when compared to the last office visit. He has been off all medications for the past 3 months, numbness in left hand is unchanged from when he was on Cymbalta. He denies loss of balance, falls, or impairments in manual dexterity.      reviewed. Body mass index is 46.48 kg/m².    PCP: None, None      Past Medical History:   Diagnosis Date    Cervical neuritis     Cervical spondylosis without myelopathy     Radiculopathy     Spondylolisthesis        Social History     Socioeconomic History    Marital status: Single     Spouse name: None    Number of children: None    Years of education: None    Highest education level: None   Tobacco Use    Smoking status: Every Day     Current packs/day: 1.00     Average packs/day: 1 pack/day for 35.0 years (35.0 ttl pk-yrs)     Types: Cigarettes    Smokeless tobacco: Never   Vaping Use    Vaping Use: Never used   Substance and Sexual Activity    Alcohol use: Not Currently    Drug use: Yes     Types: Marijuana (Weed)     Comment: daily    Sexual activity: Not Currently

## (undated) DEVICE — SHOULDER STABILIZATION KIT,                                    DISPOSABLE 12 PER BOX

## (undated) DEVICE — YANKAUER,SMOOTH HANDLE,HIGH CAPACITY: Brand: MEDLINE INDUSTRIES, INC.

## (undated) DEVICE — GLOVE ORTHO 7 1/2   MSG9475

## (undated) DEVICE — APPLICATOR MEDICATED 26 CC SOLUTION HI LT ORNG CHLORAPREP

## (undated) DEVICE — 1010 S-DRAPE TOWEL DRAPE 10/BX: Brand: STERI-DRAPE™

## (undated) DEVICE — [AGGRESSIVE 6-FLUTE BARREL BUR, ARTHROSCOPIC SHAVER BLADE,  DO NOT RESTERILIZE,  DO NOT USE IF PACKAGE IS DAMAGED,  KEEP DRY,  KEEP AWAY FROM SUNLIGHT]: Brand: FORMULA

## (undated) DEVICE — T-MAX DISPOSABLE FACE MASK 8 PER BOX

## (undated) DEVICE — 3M™ STERI-DRAPE™ U-DRAPE 1015: Brand: STERI-DRAPE™

## (undated) DEVICE — DRAPE,REIN 53X77,STERILE: Brand: MEDLINE

## (undated) DEVICE — SOLUTION IRRIG 3000ML 0.9% SOD CHL FLX CONT 0797208] ICU MEDICAL INC]

## (undated) DEVICE — [RESECTOR CUTTER, ARTHROSCOPIC SHAVER BLADE,  DO NOT RESTERILIZE,  DO NOT USE IF PACKAGE IS DAMAGED,  KEEP DRY,  KEEP AWAY FROM SUNLIGHT]: Brand: FORMULA

## (undated) DEVICE — INFLOW CASSETTE TUBING, DO NOT USE IF PACKAGE IS DAMAGED: Brand: CROSSFLOW

## (undated) DEVICE — 90-S CRUISE, SUCTION PROBE, NON-BENDABLE, MAX CUT LEVEL 1: Brand: SERFAS ENERGY

## (undated) DEVICE — CANNULA ARTHSCP L4CM DIA8MM PASSPRT BTTN

## (undated) DEVICE — BANDAGE COBAN 4 IN COMPR W4INXL5YD FOAM COHESIVE QUIK STK SELF ADH SFT

## (undated) DEVICE — SPONGE LAPAROTOMY W18XL18IN WHITE STRUNG RADIOPAQUE STERILE

## (undated) DEVICE — BANDAGE,GAUZE,BULKEE II,4.5"X4.1YD,STRL: Brand: MEDLINE

## (undated) DEVICE — CANNULA THREADED DISP 8.5 X 72MM: Brand: CLEAR-TRAC

## (undated) DEVICE — KIT OR TURNOVER

## (undated) DEVICE — DRAPE TWL SURG 16X26IN BLU ORB04] ALLCARE INC]

## (undated) DEVICE — KIT INSTR W/ 2.4MM GUIDEPIN SUT PASS WIRE NO2 FIBERWIRE

## (undated) DEVICE — BRACE SHLDR L FA L15 16IN UNIV AIRMESH BRTH SLNG 15DEG ABD

## (undated) DEVICE — PASSER SUT SELF CAPTURE ROT CUF REP REUSE COBRA

## (undated) DEVICE — SHOULDER ARTHROSCOPY PACK: Brand: MEDLINE INDUSTRIES, INC.

## (undated) DEVICE — BLANKET WRM W40.2XL55.9IN IORT LO BODY + MISTRAL AIR

## (undated) DEVICE — 4-PORT MANIFOLD: Brand: NEPTUNE 2

## (undated) DEVICE — SUTURE ETHLN SZ 2-0 L18IN NONABSORBABLE BLK L19MM PS-2 PRIM 593H